# Patient Record
Sex: MALE | Race: WHITE | Employment: FULL TIME | URBAN - METROPOLITAN AREA
[De-identification: names, ages, dates, MRNs, and addresses within clinical notes are randomized per-mention and may not be internally consistent; named-entity substitution may affect disease eponyms.]

---

## 2019-07-03 ENCOUNTER — HOSPITAL ENCOUNTER (EMERGENCY)
Age: 44
Discharge: HOME | End: 2019-07-03
Attending: EMERGENCY MEDICINE
Payer: COMMERCIAL

## 2019-07-03 ENCOUNTER — APPOINTMENT (OUTPATIENT)
Dept: GENERAL RADIOLOGY | Age: 44
End: 2019-07-03
Payer: COMMERCIAL

## 2019-07-03 VITALS
HEIGHT: 70 IN | WEIGHT: 315 LBS | HEART RATE: 79 BPM | TEMPERATURE: 99 F | SYSTOLIC BLOOD PRESSURE: 123 MMHG | OXYGEN SATURATION: 95 % | RESPIRATION RATE: 12 BRPM | BODY MASS INDEX: 45.1 KG/M2 | DIASTOLIC BLOOD PRESSURE: 71 MMHG

## 2019-07-03 DIAGNOSIS — R06.02 SHORTNESS OF BREATH: ICD-10-CM

## 2019-07-03 DIAGNOSIS — J40 BRONCHITIS: Primary | ICD-10-CM

## 2019-07-03 LAB
A/G RATIO: 1.2 (ref 1.1–2.2)
ALBUMIN SERPL-MCNC: 4 G/DL (ref 3.4–5)
ALP BLD-CCNC: 96 U/L (ref 40–129)
ALT SERPL-CCNC: 39 U/L (ref 10–40)
ANION GAP SERPL CALCULATED.3IONS-SCNC: 12 MMOL/L (ref 3–16)
AST SERPL-CCNC: 24 U/L (ref 15–37)
BASOPHILS ABSOLUTE: 0.1 K/UL (ref 0–0.2)
BASOPHILS RELATIVE PERCENT: 0.8 %
BILIRUB SERPL-MCNC: 0.3 MG/DL (ref 0–1)
BUN BLDV-MCNC: 18 MG/DL (ref 7–20)
CALCIUM SERPL-MCNC: 9.4 MG/DL (ref 8.3–10.6)
CHLORIDE BLD-SCNC: 102 MMOL/L (ref 99–110)
CO2: 25 MMOL/L (ref 21–32)
CREAT SERPL-MCNC: 0.9 MG/DL (ref 0.9–1.3)
EKG ATRIAL RATE: 72 BPM
EKG DIAGNOSIS: NORMAL
EKG P AXIS: 65 DEGREES
EKG P-R INTERVAL: 188 MS
EKG Q-T INTERVAL: 402 MS
EKG QRS DURATION: 114 MS
EKG QTC CALCULATION (BAZETT): 440 MS
EKG R AXIS: -18 DEGREES
EKG T AXIS: 38 DEGREES
EKG VENTRICULAR RATE: 72 BPM
EOSINOPHILS ABSOLUTE: 0.2 K/UL (ref 0–0.6)
EOSINOPHILS RELATIVE PERCENT: 2.3 %
GFR AFRICAN AMERICAN: >60
GFR NON-AFRICAN AMERICAN: >60
GLOBULIN: 3.3 G/DL
GLUCOSE BLD-MCNC: 107 MG/DL (ref 70–99)
HCT VFR BLD CALC: 43.1 % (ref 40.5–52.5)
HEMOGLOBIN: 14.6 G/DL (ref 13.5–17.5)
LYMPHOCYTES ABSOLUTE: 2.2 K/UL (ref 1–5.1)
LYMPHOCYTES RELATIVE PERCENT: 26.4 %
MCH RBC QN AUTO: 30 PG (ref 26–34)
MCHC RBC AUTO-ENTMCNC: 34 G/DL (ref 31–36)
MCV RBC AUTO: 88.3 FL (ref 80–100)
MONOCYTES ABSOLUTE: 0.4 K/UL (ref 0–1.3)
MONOCYTES RELATIVE PERCENT: 5.2 %
NEUTROPHILS ABSOLUTE: 5.5 K/UL (ref 1.7–7.7)
NEUTROPHILS RELATIVE PERCENT: 65.3 %
PDW BLD-RTO: 13 % (ref 12.4–15.4)
PLATELET # BLD: 275 K/UL (ref 135–450)
PMV BLD AUTO: 6 FL (ref 5–10.5)
POTASSIUM REFLEX MAGNESIUM: 4.1 MMOL/L (ref 3.5–5.1)
RBC # BLD: 4.88 M/UL (ref 4.2–5.9)
SODIUM BLD-SCNC: 139 MMOL/L (ref 136–145)
TOTAL PROTEIN: 7.3 G/DL (ref 6.4–8.2)
TROPONIN: <0.01 NG/ML
WBC # BLD: 8.4 K/UL (ref 4–11)

## 2019-07-03 PROCEDURE — 80053 COMPREHEN METABOLIC PANEL: CPT

## 2019-07-03 PROCEDURE — 93010 ELECTROCARDIOGRAM REPORT: CPT | Performed by: INTERNAL MEDICINE

## 2019-07-03 PROCEDURE — 84484 ASSAY OF TROPONIN QUANT: CPT

## 2019-07-03 PROCEDURE — 99285 EMERGENCY DEPT VISIT HI MDM: CPT

## 2019-07-03 PROCEDURE — 71046 X-RAY EXAM CHEST 2 VIEWS: CPT

## 2019-07-03 PROCEDURE — 93005 ELECTROCARDIOGRAM TRACING: CPT | Performed by: EMERGENCY MEDICINE

## 2019-07-03 PROCEDURE — 6370000000 HC RX 637 (ALT 250 FOR IP): Performed by: PHYSICIAN ASSISTANT

## 2019-07-03 PROCEDURE — 85025 COMPLETE CBC W/AUTO DIFF WBC: CPT

## 2019-07-03 RX ORDER — MULTIVITAMIN
1 TABLET ORAL DAILY
COMMUNITY

## 2019-07-03 RX ORDER — PREDNISONE 10 MG/1
TABLET ORAL
Status: DISCONTINUED
Start: 2019-07-03 | End: 2019-07-03 | Stop reason: HOSPADM

## 2019-07-03 RX ORDER — DOXYCYCLINE HYCLATE 100 MG
100 TABLET ORAL 2 TIMES DAILY
Status: ON HOLD | COMMUNITY
Start: 2019-07-01 | End: 2022-05-11 | Stop reason: HOSPADM

## 2019-07-03 RX ORDER — PREDNISONE 10 MG/1
60 TABLET ORAL DAILY
Qty: 30 TABLET | Refills: 0 | Status: SHIPPED | OUTPATIENT
Start: 2019-07-03 | End: 2019-07-08

## 2019-07-03 RX ORDER — IPRATROPIUM BROMIDE AND ALBUTEROL SULFATE 2.5; .5 MG/3ML; MG/3ML
1 SOLUTION RESPIRATORY (INHALATION) ONCE
Status: COMPLETED | OUTPATIENT
Start: 2019-07-03 | End: 2019-07-03

## 2019-07-03 RX ORDER — LISINOPRIL 10 MG/1
10 TABLET ORAL DAILY
Status: ON HOLD | COMMUNITY
Start: 2019-06-18 | End: 2022-05-11 | Stop reason: HOSPADM

## 2019-07-03 RX ORDER — PREDNISONE 10 MG/1
60 TABLET ORAL ONCE
Status: COMPLETED | OUTPATIENT
Start: 2019-07-03 | End: 2019-07-03

## 2019-07-03 RX ORDER — ALBUTEROL SULFATE 90 UG/1
AEROSOL, METERED RESPIRATORY (INHALATION)
Qty: 1 INHALER | Refills: 1 | Status: SHIPPED | OUTPATIENT
Start: 2019-07-03

## 2019-07-03 RX ORDER — ASPIRIN 81 MG/1
81 TABLET ORAL DAILY
Status: ON HOLD | COMMUNITY
End: 2022-05-11 | Stop reason: HOSPADM

## 2019-07-03 RX ADMIN — PREDNISONE 60 MG: 10 TABLET ORAL at 19:38

## 2019-07-03 RX ADMIN — IPRATROPIUM BROMIDE AND ALBUTEROL SULFATE 1 AMPULE: .5; 3 SOLUTION RESPIRATORY (INHALATION) at 19:11

## 2019-07-03 ASSESSMENT — ENCOUNTER SYMPTOMS
COUGH: 1
GASTROINTESTINAL NEGATIVE: 1
SHORTNESS OF BREATH: 1

## 2019-07-03 NOTE — ED PROVIDER NOTES
[address] ED  EMERGENCY DEPARTMENT ENCOUNTER        Pt Name: Marcella Farmer  MRN: [id number]  [place] 1975  Date of evaluation: 7/3/2019  Provider: Maryana Tenorio PA-C  PCP: No primary care provider on file. This patient was seen and evaluated by the attending physician Dr. Myesha Meneses. CHIEF COMPLAINT       Chief Complaint   Patient presents with    Shortness of Breath     pt reports taking zpack last week for cough/sob/wheezing, pt states not any better, pcp placed pt on doxy 2 days ago, pt states cough is better but still sob and wheezes when ambulating       HISTORY OF PRESENT ILLNESS   (Location/Symptom, Timing/Onset, Context/Setting, Quality, Duration, Modifying Factors, Severity)  Note limiting factors. Marcella Farmer is a 53 y.o. male brought in by private vehicle for complaints of a nonproductive cough for the past 1 week. Patient states he saw his primary care about a week ago and was given azithromycin. Patient states the cough continued he followed up with his PCP who gave him doxycycline 2 days ago. Onset of symptoms have been over the course of 1 week. Duration of symptoms have been persistent since onset. No aggravating symptoms. No alleviating symptoms. Patient denies any other complaints. Denies any chest pain. Patient denies any smoking history. Nursing Notes were all reviewed and agreed with or any disagreements were addressed  in the HPI. REVIEW OF SYSTEMS    (2-9 systems for level 4, 10 or more for level 5)     Review of Systems   Constitutional: Negative. HENT: Negative. Respiratory: Positive for cough and shortness of breath. Cardiovascular: Negative. Gastrointestinal: Negative. Genitourinary: Negative. Musculoskeletal: Negative. Skin: Negative. Neurological: Negative. Positives and Pertinent negatives as per HPI. Except as noted abovein the ROS, all other systems were reviewed and negative.        PAST MEDICAL HISTORY     Past Medical History:   Diagnosis Date    Hypertension          SURGICAL HISTORY     Past Surgical History:   Procedure Laterality Date    ANKLE SURGERY Left     CARPAL TUNNEL RELEASE Right     KNEE ARTHROSCOPY Right     TONSILLECTOMY AND ADENOIDECTOMY      TYMPANOSTOMY TUBE PLACEMENT           [address]       Discharge Medication List as of 7/3/2019  7:56 PM      CONTINUE these medications which have NOT CHANGED    Details   Cetirizine HCl (ZYRTEC ALLERGY PO) Take 1 tablet by mouth dailyHistorical Med      doxycycline hyclate (VIBRA-TABS) 100 MG tablet Take 100 mg by mouth 2 times dailyHistorical Med      Multiple Vitamin (MULTIVITAMIN) tablet Take 1 tablet by mouth dailyHistorical Med      lisinopril (PRINIVIL;ZESTRIL) 10 MG tablet Take 10 mg by mouth dailyHistorical Med      aspirin 81 MG EC tablet Take 81 mg by mouth dailyHistorical Med      Dextromethorphan-guaiFENesin (MUCINEX DM PO) Take by mouth 2 times dailyHistorical Med               ALLERGIES     Ampicillin; Bee venom; and Testosterone    FAMILYHISTORY     History reviewed. No pertinent family history.        SOCIAL HISTORY       Social History     Socioeconomic History    Marital status:      Spouse name: None    Number of children: None    Years of education: None    Highest education level: None   Occupational History    None   Social Needs    Financial resource strain: None    Food insecurity:     Worry: None     Inability: None    Transportation needs:     Medical: None     Non-medical: None   Tobacco Use    Smoking status: Never Smoker    Smokeless tobacco: Never Used   Substance and Sexual Activity    Alcohol use: Not Currently    Drug use: Not Currently    Sexual activity: None   Lifestyle    Physical activity:     Days per week: None     Minutes per session: None    Stress: None   Relationships    Social connections:     Talks on phone: None     Gets together: None     Attends J[employer] service: silhouette, mediastinal and hilar contours are normal.  The lungs are clear. There are no pleural effusions. No acute osseous abnormalities are identified. No acute cardiopulmonary disease. PROCEDURES   Unless otherwise noted below, none     Procedures    CRITICAL CARE TIME   N/A    CONSULTS:  None      EMERGENCY DEPARTMENT COURSE and DIFFERENTIAL DIAGNOSIS/MDM:   Vitals:    Vitals:    07/03/19 1745 07/03/19 1808 07/03/19 1834 07/03/19 1934   BP: (!) 152/93 129/71 123/75 123/71   Pulse: 70 72 77 79   Resp: 18 14 15 12   Temp: 99 °F (37.2 °C)      TempSrc: Oral      SpO2: 95% 98% 100% 95%   Weight: (!) 344 lb (156 kg)      Height: 5' 10\" (1.778 m)          Patient was given thefollowing medications:  Medications   predniSONE (DELTASONE) 10 MG tablet (has no administration in time range)   ipratropium-albuterol (DUONEB) nebulizer solution 1 ampule (1 ampule Inhalation Given 7/3/19 1911)   predniSONE (DELTASONE) tablet 60 mg (60 mg Oral Given 7/3/19 1938)       Patient was brought in for evaluation of a nonproductive cough for the past week. On exam patient is alert oriented afebrile hemodynamically stable breathing on room air satting at 95%. Patient appears nontoxic and in no acute respiratory distress. Old labs and records reviewed. EKG was obtained please see my attendings note for details. Chest x-ray was unremarkable. Blood work unremarkable without any acute leukocytosis or electrotlye abnormalities. Troponin is negative. Patient given steroids and a breathing treatment here. Will be discharged with breathing treatments and steroids for home. Patient given follow-up for a new primary care provider. Patient seen and evaluated by my attending as well. Patient advised to return with any new or worsening symptoms such as chest pain increased shortness of breath dizziness or feeling lightheaded. Patient verbalized understanding of when to return and was comfortable at time of discharge.   We

## 2019-07-04 NOTE — ED PROVIDER NOTES
I independently performed a history and physical on [address] [place] [address]. All diagnostic, treatment, and disposition decisions were made by myself in conjunction with the mid-level provider. Patient has had 10 days of nonproductive cough worse at night, has trace expiratory wheeze after coughing has bronchospasm with forced cough as well. He has not improved with a Z-Ilya and doxycycline, x-rays were done to rule out pneumonia. No pain or swelling in his legs, no concerns for pulmonary embolism, will treat with steroids and an inhaler for likely bronchitis. For further details of [facility] emergency department encounter, please see Jolene's documentation. The Ekg interpreted by me shows  normal sinus rhythm with a rate of 72  Axis is   Normal  QTc is  normal  Intervals and Durations are unremarkable. ST Segments: normal  No prior ekg's  Xr Chest Standard (2 Vw)    Result Date: 7/3/2019  EXAMINATION: TWO XRAY VIEWS OF THE CHEST 7/3/2019 6:01 pm COMPARISON: None. HISTORY: ORDERING SYSTEM PROVIDED HISTORY: shortness of breath TECHNOLOGIST PROVIDED HISTORY: Reason for exam:->shortness of breath Reason for Exam: Shortness of Breath (pt reports taking zpack last week for cough/sob/wheezing, pt states not any better, pcp placed pt on doxy 2 days ago, pt states cough is better but still sob and wheezes when ambulating) Acuity: Acute Type of Exam: Initial FINDINGS: The cardiac silhouette, mediastinal and hilar contours are normal.  The lungs are clear. There are no pleural effusions. No acute osseous abnormalities are identified. No acute cardiopulmonary disease.      Results for orders placed or performed during the hospital encounter of 07/03/19   CBC auto differential   Result Value Ref Range    WBC 8.4 4.0 - 11.0 K/uL    RBC 4.88 4.20 - 5.90 M/uL    Hemoglobin 14.6 13.5 - 17.5 g/dL    Hematocrit 43.1 40.5 - 52.5 %    MCV 88.3 80.0 - 100.0 fL    MCH 30.0 26.0 - 34.0 pg    MCHC 34.0 31.0 - 36.0 g/dL

## 2022-05-06 ENCOUNTER — APPOINTMENT (OUTPATIENT)
Dept: NUCLEAR MEDICINE | Age: 47
DRG: 176 | End: 2022-05-06
Payer: COMMERCIAL

## 2022-05-06 ENCOUNTER — APPOINTMENT (OUTPATIENT)
Dept: GENERAL RADIOLOGY | Age: 47
DRG: 176 | End: 2022-05-06
Payer: COMMERCIAL

## 2022-05-06 ENCOUNTER — HOSPITAL ENCOUNTER (INPATIENT)
Age: 47
LOS: 1 days | Discharge: HOME | DRG: 176 | End: 2022-05-11
Attending: STUDENT IN AN ORGANIZED HEALTH CARE EDUCATION/TRAINING PROGRAM | Admitting: HOSPITALIST
Payer: COMMERCIAL

## 2022-05-06 DIAGNOSIS — R06.02 SHORTNESS OF BREATH: Primary | ICD-10-CM

## 2022-05-06 DIAGNOSIS — R07.9 CHEST PAIN, UNSPECIFIED TYPE: ICD-10-CM

## 2022-05-06 PROBLEM — I10 HTN (HYPERTENSION): Status: ACTIVE | Noted: 2022-05-06

## 2022-05-06 PROBLEM — K21.9 GERD (GASTROESOPHAGEAL REFLUX DISEASE): Status: ACTIVE | Noted: 2022-05-06

## 2022-05-06 PROBLEM — J45.909 REACTIVE AIRWAY DISEASE WITHOUT COMPLICATION: Status: ACTIVE | Noted: 2022-05-06

## 2022-05-06 PROBLEM — M54.9 BACK PAIN: Status: ACTIVE | Noted: 2022-05-06

## 2022-05-06 LAB
A/G RATIO: 1.9 (ref 1.1–2.2)
ALBUMIN SERPL-MCNC: 4 G/DL (ref 3.4–5)
ALP BLD-CCNC: 90 U/L (ref 40–129)
ALT SERPL-CCNC: 31 U/L (ref 10–40)
ANION GAP SERPL CALCULATED.3IONS-SCNC: 11 MMOL/L (ref 3–16)
AST SERPL-CCNC: 15 U/L (ref 15–37)
BASOPHILS ABSOLUTE: 0 K/UL (ref 0–0.2)
BASOPHILS RELATIVE PERCENT: 0.4 %
BILIRUB SERPL-MCNC: 0.4 MG/DL (ref 0–1)
BUN BLDV-MCNC: 19 MG/DL (ref 7–20)
CALCIUM SERPL-MCNC: 8.7 MG/DL (ref 8.3–10.6)
CHLORIDE BLD-SCNC: 102 MMOL/L (ref 99–110)
CO2: 25 MMOL/L (ref 21–32)
CREAT SERPL-MCNC: 0.7 MG/DL (ref 0.9–1.3)
EKG ATRIAL RATE: 73 BPM
EKG ATRIAL RATE: 91 BPM
EKG DIAGNOSIS: NORMAL
EKG DIAGNOSIS: NORMAL
EKG P AXIS: 59 DEGREES
EKG P AXIS: 61 DEGREES
EKG P-R INTERVAL: 174 MS
EKG P-R INTERVAL: 192 MS
EKG Q-T INTERVAL: 372 MS
EKG Q-T INTERVAL: 408 MS
EKG QRS DURATION: 114 MS
EKG QRS DURATION: 116 MS
EKG QTC CALCULATION (BAZETT): 449 MS
EKG QTC CALCULATION (BAZETT): 457 MS
EKG R AXIS: -18 DEGREES
EKG R AXIS: -34 DEGREES
EKG T AXIS: 30 DEGREES
EKG T AXIS: 43 DEGREES
EKG VENTRICULAR RATE: 73 BPM
EKG VENTRICULAR RATE: 91 BPM
EOSINOPHILS ABSOLUTE: 0.3 K/UL (ref 0–0.6)
EOSINOPHILS RELATIVE PERCENT: 3 %
GFR AFRICAN AMERICAN: >60
GFR NON-AFRICAN AMERICAN: >60
GLUCOSE BLD-MCNC: 106 MG/DL (ref 70–99)
HCT VFR BLD CALC: 44 % (ref 40.5–52.5)
HEMOGLOBIN: 15.3 G/DL (ref 13.5–17.5)
INFLUENZA A: NOT DETECTED
INFLUENZA B: NOT DETECTED
LV EF: 53 %
LVEF MODALITY: NORMAL
LYMPHOCYTES ABSOLUTE: 2.7 K/UL (ref 1–5.1)
LYMPHOCYTES RELATIVE PERCENT: 25.5 %
MCH RBC QN AUTO: 30.1 PG (ref 26–34)
MCHC RBC AUTO-ENTMCNC: 34.7 G/DL (ref 31–36)
MCV RBC AUTO: 86.7 FL (ref 80–100)
MONOCYTES ABSOLUTE: 0.4 K/UL (ref 0–1.3)
MONOCYTES RELATIVE PERCENT: 3.9 %
NEUTROPHILS ABSOLUTE: 7 K/UL (ref 1.7–7.7)
NEUTROPHILS RELATIVE PERCENT: 67.2 %
PDW BLD-RTO: 13.4 % (ref 12.4–15.4)
PLATELET # BLD: 210 K/UL (ref 135–450)
PMV BLD AUTO: 6.4 FL (ref 5–10.5)
POTASSIUM SERPL-SCNC: 3.7 MMOL/L (ref 3.5–5.1)
RBC # BLD: 5.07 M/UL (ref 4.2–5.9)
SARS-COV-2 RNA, RT PCR: NOT DETECTED
SODIUM BLD-SCNC: 138 MMOL/L (ref 136–145)
TOTAL PROTEIN: 6.1 G/DL (ref 6.4–8.2)
TROPONIN: <0.01 NG/ML
TROPONIN: <0.01 NG/ML
WBC # BLD: 10.4 K/UL (ref 4–11)

## 2022-05-06 PROCEDURE — 94761 N-INVAS EAR/PLS OXIMETRY MLT: CPT

## 2022-05-06 PROCEDURE — 6360000002 HC RX W HCPCS: Performed by: HOSPITALIST

## 2022-05-06 PROCEDURE — G0378 HOSPITAL OBSERVATION PER HR: HCPCS

## 2022-05-06 PROCEDURE — 71045 X-RAY EXAM CHEST 1 VIEW: CPT

## 2022-05-06 PROCEDURE — 93010 ELECTROCARDIOGRAM REPORT: CPT | Performed by: INTERNAL MEDICINE

## 2022-05-06 PROCEDURE — 93005 ELECTROCARDIOGRAM TRACING: CPT | Performed by: STUDENT IN AN ORGANIZED HEALTH CARE EDUCATION/TRAINING PROGRAM

## 2022-05-06 PROCEDURE — 99285 EMERGENCY DEPT VISIT HI MDM: CPT

## 2022-05-06 PROCEDURE — 6370000000 HC RX 637 (ALT 250 FOR IP)

## 2022-05-06 PROCEDURE — 6370000000 HC RX 637 (ALT 250 FOR IP): Performed by: STUDENT IN AN ORGANIZED HEALTH CARE EDUCATION/TRAINING PROGRAM

## 2022-05-06 PROCEDURE — 85025 COMPLETE CBC W/AUTO DIFF WBC: CPT

## 2022-05-06 PROCEDURE — 36415 COLL VENOUS BLD VENIPUNCTURE: CPT

## 2022-05-06 PROCEDURE — 96372 THER/PROPH/DIAG INJ SC/IM: CPT

## 2022-05-06 PROCEDURE — 3430000000 HC RX DIAGNOSTIC RADIOPHARMACEUTICAL: Performed by: HOSPITALIST

## 2022-05-06 PROCEDURE — 84484 ASSAY OF TROPONIN QUANT: CPT

## 2022-05-06 PROCEDURE — 87636 SARSCOV2 & INF A&B AMP PRB: CPT

## 2022-05-06 PROCEDURE — 99218 PR INITIAL OBSERVATION CARE/DAY 30 MINUTES: CPT

## 2022-05-06 PROCEDURE — 80053 COMPREHEN METABOLIC PANEL: CPT

## 2022-05-06 PROCEDURE — A9502 TC99M TETROFOSMIN: HCPCS | Performed by: HOSPITALIST

## 2022-05-06 PROCEDURE — 93005 ELECTROCARDIOGRAM TRACING: CPT | Performed by: HOSPITALIST

## 2022-05-06 PROCEDURE — 93017 CV STRESS TEST TRACING ONLY: CPT

## 2022-05-06 PROCEDURE — 94640 AIRWAY INHALATION TREATMENT: CPT

## 2022-05-06 PROCEDURE — 78452 HT MUSCLE IMAGE SPECT MULT: CPT

## 2022-05-06 PROCEDURE — 2580000003 HC RX 258: Performed by: HOSPITALIST

## 2022-05-06 PROCEDURE — 6370000000 HC RX 637 (ALT 250 FOR IP): Performed by: HOSPITALIST

## 2022-05-06 RX ORDER — FLUTICASONE PROPIONATE 50 MCG
2 SPRAY, SUSPENSION (ML) NASAL DAILY
Status: DISCONTINUED | OUTPATIENT
Start: 2022-05-06 | End: 2022-05-11 | Stop reason: HOSPADM

## 2022-05-06 RX ORDER — ASPIRIN 81 MG/1
81 TABLET ORAL DAILY
Status: DISCONTINUED | OUTPATIENT
Start: 2022-05-06 | End: 2022-05-09

## 2022-05-06 RX ORDER — ONDANSETRON 2 MG/ML
4 INJECTION INTRAMUSCULAR; INTRAVENOUS EVERY 6 HOURS PRN
Status: DISCONTINUED | OUTPATIENT
Start: 2022-05-06 | End: 2022-05-11 | Stop reason: HOSPADM

## 2022-05-06 RX ORDER — FLUTICASONE FUROATE AND VILANTEROL 100; 25 UG/1; UG/1
1 POWDER RESPIRATORY (INHALATION) DAILY
COMMUNITY

## 2022-05-06 RX ORDER — CETIRIZINE HYDROCHLORIDE 10 MG/1
10 TABLET ORAL DAILY
Status: DISCONTINUED | OUTPATIENT
Start: 2022-05-06 | End: 2022-05-11 | Stop reason: HOSPADM

## 2022-05-06 RX ORDER — ACETAMINOPHEN 325 MG/1
650 TABLET ORAL EVERY 6 HOURS PRN
Status: DISCONTINUED | OUTPATIENT
Start: 2022-05-06 | End: 2022-05-11 | Stop reason: HOSPADM

## 2022-05-06 RX ORDER — BUDESONIDE AND FORMOTEROL FUMARATE DIHYDRATE 80; 4.5 UG/1; UG/1
2 AEROSOL RESPIRATORY (INHALATION) 2 TIMES DAILY
Status: DISCONTINUED | OUTPATIENT
Start: 2022-05-06 | End: 2022-05-11 | Stop reason: HOSPADM

## 2022-05-06 RX ORDER — LISINOPRIL 10 MG/1
10 TABLET ORAL DAILY
Status: DISCONTINUED | OUTPATIENT
Start: 2022-05-06 | End: 2022-05-06

## 2022-05-06 RX ORDER — GABAPENTIN 100 MG/1
100 CAPSULE ORAL 3 TIMES DAILY
COMMUNITY

## 2022-05-06 RX ORDER — FLUTICASONE PROPIONATE 50 MCG
2 SPRAY, SUSPENSION (ML) NASAL DAILY
COMMUNITY

## 2022-05-06 RX ORDER — SODIUM CHLORIDE 0.9 % (FLUSH) 0.9 %
5-40 SYRINGE (ML) INJECTION EVERY 12 HOURS SCHEDULED
Status: DISCONTINUED | OUTPATIENT
Start: 2022-05-06 | End: 2022-05-11 | Stop reason: HOSPADM

## 2022-05-06 RX ORDER — PANTOPRAZOLE SODIUM 40 MG/1
40 TABLET, DELAYED RELEASE ORAL
Status: DISCONTINUED | OUTPATIENT
Start: 2022-05-06 | End: 2022-05-11 | Stop reason: HOSPADM

## 2022-05-06 RX ORDER — SODIUM CHLORIDE 0.9 % (FLUSH) 0.9 %
5-40 SYRINGE (ML) INJECTION PRN
Status: DISCONTINUED | OUTPATIENT
Start: 2022-05-06 | End: 2022-05-11 | Stop reason: HOSPADM

## 2022-05-06 RX ORDER — ONDANSETRON 4 MG/1
4 TABLET, ORALLY DISINTEGRATING ORAL EVERY 8 HOURS PRN
Status: DISCONTINUED | OUTPATIENT
Start: 2022-05-06 | End: 2022-05-11 | Stop reason: HOSPADM

## 2022-05-06 RX ORDER — SODIUM CHLORIDE 9 MG/ML
INJECTION, SOLUTION INTRAVENOUS PRN
Status: DISCONTINUED | OUTPATIENT
Start: 2022-05-06 | End: 2022-05-11 | Stop reason: HOSPADM

## 2022-05-06 RX ORDER — VALSARTAN 80 MG/1
80 TABLET ORAL DAILY
Status: DISCONTINUED | OUTPATIENT
Start: 2022-05-06 | End: 2022-05-11 | Stop reason: HOSPADM

## 2022-05-06 RX ORDER — POLYETHYLENE GLYCOL 3350 17 G/17G
17 POWDER, FOR SOLUTION ORAL DAILY PRN
Status: DISCONTINUED | OUTPATIENT
Start: 2022-05-06 | End: 2022-05-11 | Stop reason: HOSPADM

## 2022-05-06 RX ORDER — ASPIRIN 325 MG
325 TABLET ORAL ONCE
Status: COMPLETED | OUTPATIENT
Start: 2022-05-06 | End: 2022-05-06

## 2022-05-06 RX ORDER — ACETAMINOPHEN 650 MG/1
650 SUPPOSITORY RECTAL EVERY 6 HOURS PRN
Status: DISCONTINUED | OUTPATIENT
Start: 2022-05-06 | End: 2022-05-11 | Stop reason: HOSPADM

## 2022-05-06 RX ORDER — OMEPRAZOLE 20 MG/1
40 CAPSULE, DELAYED RELEASE ORAL DAILY
COMMUNITY

## 2022-05-06 RX ORDER — ATORVASTATIN CALCIUM 40 MG/1
40 TABLET, FILM COATED ORAL NIGHTLY
Status: DISCONTINUED | OUTPATIENT
Start: 2022-05-06 | End: 2022-05-09

## 2022-05-06 RX ORDER — GABAPENTIN 100 MG/1
100 CAPSULE ORAL 3 TIMES DAILY
Status: DISCONTINUED | OUTPATIENT
Start: 2022-05-06 | End: 2022-05-11 | Stop reason: HOSPADM

## 2022-05-06 RX ORDER — VALSARTAN 80 MG/1
80 TABLET ORAL DAILY
COMMUNITY

## 2022-05-06 RX ORDER — IPRATROPIUM BROMIDE AND ALBUTEROL SULFATE 2.5; .5 MG/3ML; MG/3ML
1 SOLUTION RESPIRATORY (INHALATION) ONCE
Status: COMPLETED | OUTPATIENT
Start: 2022-05-06 | End: 2022-05-06

## 2022-05-06 RX ORDER — ENOXAPARIN SODIUM 100 MG/ML
40 INJECTION SUBCUTANEOUS 2 TIMES DAILY
Status: DISCONTINUED | OUTPATIENT
Start: 2022-05-06 | End: 2022-05-09

## 2022-05-06 RX ADMIN — ENOXAPARIN SODIUM 40 MG: 100 INJECTION SUBCUTANEOUS at 21:19

## 2022-05-06 RX ADMIN — ASPIRIN 325 MG: 325 TABLET ORAL at 04:11

## 2022-05-06 RX ADMIN — ENOXAPARIN SODIUM 40 MG: 100 INJECTION SUBCUTANEOUS at 08:55

## 2022-05-06 RX ADMIN — REGADENOSON 0.4 MG: 0.08 INJECTION, SOLUTION INTRAVENOUS at 11:59

## 2022-05-06 RX ADMIN — VALSARTAN 80 MG: 80 TABLET, FILM COATED ORAL at 09:25

## 2022-05-06 RX ADMIN — IPRATROPIUM BROMIDE AND ALBUTEROL SULFATE 1 AMPULE: .5; 3 SOLUTION RESPIRATORY (INHALATION) at 04:20

## 2022-05-06 RX ADMIN — Medication 10 ML: at 21:20

## 2022-05-06 RX ADMIN — GABAPENTIN 100 MG: 100 CAPSULE ORAL at 09:25

## 2022-05-06 RX ADMIN — Medication 2 PUFF: at 19:01

## 2022-05-06 RX ADMIN — ATORVASTATIN CALCIUM 40 MG: 40 TABLET, FILM COATED ORAL at 21:20

## 2022-05-06 RX ADMIN — CETIRIZINE HYDROCHLORIDE 10 MG: 10 TABLET ORAL at 08:56

## 2022-05-06 RX ADMIN — PANTOPRAZOLE SODIUM 40 MG: 40 TABLET, DELAYED RELEASE ORAL at 09:25

## 2022-05-06 RX ADMIN — FLUTICASONE PROPIONATE 2 SPRAY: 50 SPRAY, METERED NASAL at 16:07

## 2022-05-06 RX ADMIN — TETROFOSMIN 30.1 MILLICURIE: 1.38 INJECTION, POWDER, LYOPHILIZED, FOR SOLUTION INTRAVENOUS at 12:00

## 2022-05-06 ASSESSMENT — PAIN DESCRIPTION - LOCATION: LOCATION: CHEST

## 2022-05-06 ASSESSMENT — PAIN DESCRIPTION - DESCRIPTORS: DESCRIPTORS: DULL;DISCOMFORT

## 2022-05-06 ASSESSMENT — LIFESTYLE VARIABLES
HOW OFTEN DO YOU HAVE A DRINK CONTAINING ALCOHOL: NEVER
HOW OFTEN DO YOU HAVE A DRINK CONTAINING ALCOHOL: NEVER

## 2022-05-06 ASSESSMENT — PAIN DESCRIPTION - ORIENTATION: ORIENTATION: MID

## 2022-05-06 ASSESSMENT — PAIN - FUNCTIONAL ASSESSMENT: PAIN_FUNCTIONAL_ASSESSMENT: 0-10

## 2022-05-06 NOTE — PROGRESS NOTES
4 Eyes Skin Assessment     The patient is being assess for   Admission    I agree that 2 RN's have performed a thorough Head to Toe Skin Assessment on the patient. ALL assessment sites listed below have been assessed. Areas assessed for pressure by both nurses:   [x]   Head, Face, and Ears   [x]   Shoulders, Back, and Chest, Abdomen  [x]   Arms, Elbows, and Hands   [x]   Coccyx, Sacrum, and Ischium  [x]   Legs, Feet, and Heels        Skin Assessed Under all Medical Devices by both nurses:  N/A              All Mepilex Borders were peeled back and area peeked at by both nurses:  No: N/A  Please list where Mepilex Borders are located:  N/A             **SHARE this note so that the co-signing nurse is able to place an eSignature**    Co-signer eSignature: Electronically signed by Thais Rodriguez RN on 5/6/22 at 6:27 PM EDT    Does the Patient have Skin Breakdown related to pressure?   No            Mu Prevention initiated:  NA   Wound Care Orders initiated:  NA      [facility] nurse consulted for Pressure Injury (Stage 3,4, Unstageable, DTI, NWPT, Complex wounds)and New or Established Ostomies:  NA      Primary Nurse eSignature: Electronically signed by Rakel Hurst RN on 5/6/22 at 6:21 PM EDT

## 2022-05-06 NOTE — H&P
Hospital Medicine History & Physical      PCP: Jenn Gupta MD    Date of Admission: 5/6/2022    Date of Service: Pt seen/examined on 5/6/2022      Chief Complaint:    Chief Complaint   Patient presents with    Shortness of Breath     Hx: asthma; being on prednisone 8 days; feeling jittery and increased SOB tonight; pt c/o soreness to chest 6/10         History Of Present Illness: The patient is a 50 y.o. male with HTN who presented to [facility] ED with complaint of chest pain that has been present since yesterday. He states that he just completed an 8 day course of prednisone for back pain. Starting yesterday he had a tingling sensation begin at his left upper chest which radiated down his left arm. He did not describe this as pain. He states that he eventually developed chest \"pressure\" over his breastbone. He states this does not radiate. It has been intermittent since onset. Associated with SOB that he describes as \"not being able to take a deep breath\". Aggravated by nothing. Alleviated by nothing. He jones not have history of pain like this in the past.  He endorses having a cardiac work up in the past x2, more for surveillance as his father does have history of heart disease and apparently his whole family went to get checked. Denies any URI symptoms, cough, pain with inspiration, recent new exercises or heavy lifting, abd pain, n/v/d. Cardiac risk factors:   HTN: Yes  HLD: No  DM: No  Body mass index is 51.65 kg/m². Tobacco abuse: Never  First degree family or personal history of CAD: Father with heart disease    The patient denies any known history of connective tissue disease or aneurysms. The patient denies any recent surgery, hospitalization, immobilization, long car/plane trip, active malignancy, long bone fracture, or history of DVT/PE.       Past Medical History:        Diagnosis Date    Arthritis     Asthma     Hypertension        Past Surgical History:        Procedure Laterality Date    ANKLE SURGERY Left     CARPAL TUNNEL RELEASE Right     KNEE ARTHROSCOPY Right     TONSILLECTOMY AND ADENOIDECTOMY      TYMPANOSTOMY TUBE PLACEMENT         Medications Prior to Admission:    Prior to Admission medications    Medication Sig Start Date End Date Taking? Authorizing Provider   fluticasone (FLONASE) 50 MCG/ACT nasal spray 2 sprays by Each Nostril route daily   Yes Historical Provider, MD   fluticasone-vilanterol (BREO ELLIPTA) 100-25 MCG/INH AEPB inhaler Inhale 1 puff into the lungs daily   Yes Historical Provider, MD   valsartan (DIOVAN) 80 MG tablet Take 80 mg by mouth daily   Yes Historical Provider, MD   omeprazole (PRILOSEC) 20 MG delayed release capsule Take 40 mg by mouth daily   Yes Historical Provider, MD   gabapentin (NEURONTIN) 100 MG capsule Take 100 mg by mouth 3 times daily. As needed   Yes Historical Provider, MD   Cetirizine HCl (ZYRTEC ALLERGY PO) Take 1 tablet by mouth daily    Historical Provider, MD   doxycycline hyclate (VIBRA-TABS) 100 MG tablet Take 100 mg by mouth 2 times daily  Patient not taking: Reported on 5/6/2022 7/1/19   Historical Provider, MD   Multiple Vitamin (MULTIVITAMIN) tablet Take 1 tablet by mouth daily    Historical Provider, MD   lisinopril (PRINIVIL;ZESTRIL) 10 MG tablet Take 10 mg by mouth daily  Patient not taking: Reported on 5/6/2022 6/18/19   Historical Provider, MD   aspirin 81 MG EC tablet Take 81 mg by mouth daily    Historical Provider, MD   Dextromethorphan-guaiFENesin ([address] DM PO) Take by mouth 2 times daily    Historical Provider, MD   albuterol sulfate HFA (PROAIR HFA) 108 (90 Base) MCG/ACT inhaler Use every 4 hours 2 puffs while awake for 7-10 days then PRN wheezing  Dispense with SPACER and Instruct on use. May sub Ventolin or Proventil as needed per [employer].   Patient not taking: Reported on 5/6/2022 7/3/19   Yunior Finney PA-C       Allergies:  Ampicillin, Bee venom, and Testosterone    Social History:  The patient currently lives at home with wife    TOBACCO:   reports that he has never smoked. He has never used smokeless tobacco.  ETOH:   reports previous alcohol use. Family History:   Positive as follows:    History reviewed. No pertinent family history. REVIEW OF SYSTEMS:       Constitutional: Negative for fever   HENT: Negative for sore throat   Eyes: Negative for redness   Respiratory: + dyspnea, negative for cough   Cardiovascular: + for chest pain  Gastrointestinal: Negative for vomiting, diarrhea   Genitourinary: Negative for hematuria   Musculoskeletal: Negative for arthralgias   Skin: Negative for rash   Neurological: Negative for syncope   Hematological: Negative for adenopathy   Psychiatric/Behavorial: Negative for anxiety    PHYSICAL EXAM:    /85   Pulse 80   Temp 98 °F (36.7 °C) (Oral)   Resp 8   Ht 5' 10\" (1.778 m)   Wt (!) 360 lb (163.3 kg)   SpO2 93%   BMI 51.65 kg/m²     Gen: No distress. Alert. Obese. Eyes: PERRL. No sclera icterus. No conjunctival injection. ENT: No discharge. Pharynx clear. Neck: No JVD. Trachea midline. Resp: No accessory muscle use. No crackles. No wheezes. No rhonchi. CV: Regular rate. Regular rhythm. No murmur. No rub. No edema. Capillary Refill: Brisk,< 3 seconds   Peripheral Pulses: +2 palpable, equal bilaterally   GI: Non-tender. Non-distended. No masses. No organomegaly. Normal bowel sounds. No hernia. Skin: Warm and dry. No nodule on exposed extremities. No rash on exposed extremities. M/S: No cyanosis. No joint deformity. No clubbing. Neuro: Awake. Grossly nonfocal    Psych: Oriented x 3. No anxiety or agitation.      CBC:   Recent Labs     05/06/22  0220   WBC 10.4   HGB 15.3   HCT 44.0   MCV 86.7        BMP:   Recent Labs     05/06/22 0315      K 3.7      CO2 25   BUN 19   CREATININE 0.7*     LIVER PROFILE:   Recent Labs     05/06/22 0315   AST 15   ALT 31   BILITOT 0.4   ALKPHOS 90     CARDIAC ENZYMES  Recent Labs 05/06/22  0315 05/06/22  0558   TROPONINI <0.01 <0.01     CULTURES  Results for Sarah Saldaña (MRN [id number]) as of 5/6/2022 08:13   Ref. Range 5/6/2022 02:45   INFLUENZA A Latest Ref Range: NOT DETECTED  NOT DETECTED   INFLUENZA B Latest Ref Range: NOT DETECTED  NOT DETECTED   SARS-CoV-2 RNA, RT PCR Latest Ref Range: NOT DETECTED  NOT DETECTED       EKG:  I have reviewed the EKG with the following interpretation:   5/6/22  Normal sinus rhythm  Normal ECG  When compared with ECG of 06-MAY-2022 02:16, (unconfirmed)  No significant change was found    5/6/22  Normal sinus rhythm  Left axis deviation  Abnormal ECG  When compared with ECG of 03-JUL-2019 17:48,  No significant change was found    RADIOLOGY  XR CHEST PORTABLE   Final Result   Clear chest without acute cardiopulmonary process. NM Cardiac Stress Test Nuclear Imaging    (Results Pending)        Pertinent previous results reviewed   Stress ECHO 3//23/18  Interpetation Summary:      1. Negative ECG for ischemia with graded exercise test.    2. Duke Treadmill Score is 6 which indicates low risk.    3. Negative stress echo with no indication of inducible ischemia.    4. Prolonged run of ventricular bigeminy with exercise. Stress ECHO 9/10/12  IMPRESSION- Nondiagnostic and unremarkable rest/stress   echocardiogram using echocontrast. The study is nondiagnostic due to   inability to reach targeted heart rate. ASSESSMENT/PLAN:  #Chest pain  -Cardiac risk factors:   HTN: Yes  HLD: No  DM: No  Body mass index is 51.65 kg/m².    Tobacco abuse: Never  First degree family or personal history of CAD: Father with heart disease  -EKG x2 negative for ischemia  -Serial trops negative thus far  -2 previous negative cardiac stress tests  -Pharm stress test today; possibility of 2 day  -Patient denies needs for anxiolytic for NM imaging    #HTN  -BP stable  -Continue valsartan    #GERD  -On PPI    #RAD  -New diagnosis per patient  -Continue home daily inhaler    #Back pain  -Currently being worked up for this OP  -Recently prescribed 4/27/22 Gabapentin 100 mg TID for 30 days; continue    DVT Prophylaxis: Lovenox  Diet: Diet NPO  Diet NPO  Code Status: Full Code    Constantine Espinoza PA-C  5/6/2022 8:49 AM

## 2022-05-06 NOTE — ACP (ADVANCE CARE PLANNING)
Advance Care Planning     Advance Care Planning Inpatient Note  [facility] Department    Today's Date: 5/6/2022  Unit: [address] ED    Received request from patient. Upon review of chart and communication with care team, patient's decision making abilities are not in question. . Patient was/were present in the room during visit. Goals of ACP Conversation:  Discuss advance care planning documents    Health Care Decision Makers:     No healthcare decision makers have been documented. Click here to complete [address] including selection of the Healthcare Decision Maker Relationship (ie \"Primary\")  Summary:  Completed [facility]    Advance Care Planning Documents (Patient Wishes):  Healthcare Power of /Advance Directive Appointment of Health Care Agent     Assessment:  Pt clear that he wanted his wife to make decisions on his behalf, including end of life decisions. Pt thus declined completing Living Will at this time. Interventions:  Assisted in the completion of documents according to patient's wishes at this time    Care Preferences Communicated:   No    Outcomes/Plan:  New advance directive completed. Returned original document(s) to patient, as well as copies for distribution to appointed agents  Copy of advance directive given to staff to scan into medical record.     Electronically signed by Jaqueline Mitchell on 5/6/2022 at 9:55 AM

## 2022-05-06 NOTE — PROGRESS NOTES
Patient admitted to room from Dr. Renate Whitman. Patient oriented to room, call light, bed rails, phone, lights and bathroom. Patient instructed about the schedule of the day including: vital sign frequency, lab draws, possible tests, frequency of MD and staff rounds, daily weights, I &O's and prescribed diet. Telemetry box in place, patient aware of placement and reason. Bed locked, in lowest position, side rails up 2/4, call light within reach.

## 2022-05-06 NOTE — PROGRESS NOTES
Bedside report and transfer of care given to [facility]. Pt currently resting in bed with the call light within reach. Pt denies any other care needs at this time. Pt stable at this time.

## 2022-05-06 NOTE — PROGRESS NOTES
Pt is a 2 day stress test. Stress portion completed via [employer]. Pt tolerated stress test. Denies current CP. Pt is currently drinking Pepsi while waiting for scan and then will return to assigned room. Pt is no longer NPO for stress test. On 5/7/22 pt will have resting scan.

## 2022-05-06 NOTE — FLOWSHEET NOTE
05/06/22 1730   Vital Signs   Temp 96.9 °F (36.1 °C)   Temp Source Oral   Pulse 93   Heart Rate Source Monitor   Resp 18   BP (!) 143/86   BP Location Left lower arm   MAP (Calculated) 105   Patient Position Sitting   Level of Consciousness Alert (0)   MEWS Score 1   Pain Assessment   Pain Assessment None - Denies Pain   Oxygen Therapy   SpO2 94 %   Pulse Oximeter Device Mode Intermittent   Pulse Oximeter Device Location Left;Finger   O2 Device None (Room air)

## 2022-05-06 NOTE — PLAN OF CARE
Problem: Discharge Planning  Goal: Discharge to home or other facility with appropriate resources  Outcome: Progressing  Flowsheets  Taken 5/6/2022 1801  Discharge to home or other facility with appropriate resources:   Identify barriers to discharge with patient and caregiver   Identify discharge learning needs (meds, wound care, etc)   Refer to discharge planning if patient needs post-hospital services based on physician order or complex needs related to functional status, cognitive ability or social support system   Arrange for needed discharge resources and transportation as appropriate   Arrange for interpreters to assist at discharge as needed  Taken 5/6/2022 1740  Discharge to home or other facility with appropriate resources: Identify barriers to discharge with patient and caregiver

## 2022-05-06 NOTE — ED NOTES
6018- Perfect Serve sent to hospitalist regarding rounding in order for the pt.  To go to Stress test.     Nini Hernandez RN  05/06/22 0349

## 2022-05-06 NOTE — ED NOTES
Report given to PCU RN; pt left ER in stable condition. Blood pressure 137/79, pulse 80, temperature 98 °F (36.7 °C), temperature source Oral, resp.  rate 21, height 5' 10\" (1.778 m), weight (!) 360 lb (163.3 kg), SpO2 90 %.   simona Powell RN  05/06/22 3469

## 2022-05-06 NOTE — CARE COORDINATION
CM reviewed chart and met with patient at bedside to discuss any potential discharge needs. Patient lives with spouse, Martha Franklin, and working full time. No needs identified for discharge intervention at this time. MD and bedside RN  if needs arise please consult case management for discharge intervention. [facility] not following at this time.        Claudio Khoury, RN

## 2022-05-06 NOTE — ED PROVIDER NOTES
[address] ED      CHIEF COMPLAINT  Chest pain, shortness of breath       HISTORY OF PRESENT ILLNESS  Ena Parrish is a 50 y.o. male with a past medical history of HTN, asthma who presents to the ED complaining of shortness of breath and chest discomfort. Recent prednsione for back pain. Onset: yesterday  Pain Location: midsternal   Radiation: denies,   Severity: tightness  Palliative Factors: rest  Provocative Factors: exertion, denies pleuritic or positional chest pain  Shortness of breath: yes  Cough: denies  Fever: denies    Denies history of blood clots or active malignancy. Patient denies unilateral leg swelling, hemoptysis, recent travel or surgery/immobilization, or OCP or other hormone use. Family hx of cardiac disease. They report that their most recent cardiac evaluation was: 2018    Patient does not smoke. Old records reviewed:     2018 stress Test:  Interpetation Summary:      1. Negative ECG for ischemia with graded exercise test.    2. Duke Treadmill Score is 6 which indicates low risk.    3. Negative stress echo with no indication of inducible ischemia.    4. Prolonged run of ventricular bigeminy with exercise. No other complaints, modifying factors or associated symptoms. I have reviewed the following from the nursing documentation. Past Medical History:   Diagnosis Date    Hypertension      Past Surgical History:   Procedure Laterality Date    ANKLE SURGERY Left     CARPAL TUNNEL RELEASE Right     KNEE ARTHROSCOPY Right     TONSILLECTOMY AND ADENOIDECTOMY      TYMPANOSTOMY TUBE PLACEMENT       History reviewed. No pertinent family history.   Social History     Socioeconomic History    Marital status:      Spouse name: Not on file    Number of children: Not on file    Years of education: Not on file    Highest education level: Not on file   Occupational History    Not on file   Tobacco Use    Smoking status: Never Smoker    Smokeless tobacco: Never Used   Substance and Sexual Activity    Alcohol use: Not Currently    Drug use: Not Currently    Sexual activity: Not on file   Other Topics Concern    Not on file   Social History Narrative    Not on file     Social Determinants of Health     Financial Resource Strain:     Difficulty of Paying Living Expenses: Not on file   Food Insecurity:     Worried About Running Out of Food in the Last Year: Not on file    Nicholas of Food in the Last Year: Not on file   Transportation Needs:     Lack of Transportation (Medical): Not on file    Lack of Transportation (Non-Medical):  Not on file   Physical Activity:     Days of Exercise per Week: Not on file    Minutes of Exercise per Session: Not on file   Stress:     Feeling of Stress : Not on file   Social Connections:     Frequency of Communication with Friends and Family: Not on file    Frequency of Social Gatherings with Friends and Family: Not on file    Attends Yarsani Services: Not on file    Active Member of [address] [place] [address] or Organizations: Not on file    Attends Club or Organization Meetings: Not on file    Marital Status: Not on file   Intimate Partner Violence:     Fear of Current or Ex-Partner: Not on file    Emotionally Abused: Not on file    Physically Abused: Not on file    Sexually Abused: Not on file   Housing Stability:     Unable to Pay for Housing in the Last Year: Not on file    Number of [place] in the Last Year: Not on file    Unstable Housing in the Last Year: Not on file     Current Facility-Administered Medications   Medication Dose Route Frequency Provider Last Rate Last Admin    aspirin tablet 325 mg  325 mg Oral Once [place] MD Tony         Current Outpatient Medications   Medication Sig Dispense Refill    Cetirizine HCl (ZYRTEC ALLERGY PO) Take 1 tablet by mouth daily      doxycycline hyclate (VIBRA-TABS) 100 MG tablet Take 100 mg by mouth 2 times daily      Multiple Vitamin (MULTIVITAMIN) tablet Take 1 tablet by mouth daily      lisinopril (PRINIVIL;ZESTRIL) 10 MG tablet Take 10 mg by mouth daily      aspirin 81 MG EC tablet Take 81 mg by mouth daily      Dextromethorphan-guaiFENesin (MUCINEX DM PO) Take by mouth 2 times daily      albuterol sulfate HFA (PROAIR HFA) 108 (90 Base) MCG/ACT inhaler Use every 4 hours 2 puffs while awake for 7-10 days then PRN wheezing  Dispense with SPACER and Instruct on use. May sub Ventolin or Proventil as needed per [employer]. 1 Inhaler 1     Allergies   Allergen Reactions    Ampicillin Other (See Comments)     During childhood      Bee Venom Other (See Comments)    Testosterone      Swelling in lower extremities        REVIEW OF SYSTEMS  All systems reviewed, pertinent positives per HPI otherwise noted to be negative. PHYSICAL EXAM  BP (!) 172/90   Pulse 98   Temp 98 °F (36.7 °C) (Oral)   Resp 20   Ht 5' 10\" (1.778 m)   Wt (!) 360 lb (163.3 kg)   SpO2 92%   BMI 51.65 kg/m²    GENERAL APPEARANCE: Awake and alert. Cooperative. no distress. HENT: Normocephalic. Atraumatic. Mucous membranes are moist  NECK: Supple. Full range of motion of the neck without stiffness or pain. EYES: PERRL. EOM's grossly intact. HEART/CHEST: RRR. No murmurs. Chest wall is not tender to palpation. LUNGS: Respirations unlabored. CTAB. Good air exchange. Speaking comfortably in full sentences. ABDOMEN: No tenderness. Soft. Non-distended. No masses. No organomegaly. No guarding or rebound. MUSCULOSKELETAL: No extremity edema. Compartments soft. No deformity. No tenderness in the extremities. All extremities neurovascularly intact. SKIN: Warm and dry. No acute rashes. NEUROLOGICAL: Alert and oriented. No gross facial drooping. Strength 5/5, sensation intact. PSYCHIATRIC: Normal mood and affect. LABS  I have reviewed all labs for this visit.    Results for orders placed or performed during the hospital encounter of 05/06/22   COVID-19 & Influenza Combo    Specimen: Nasopharyngeal Swab   Result Value Ref Range    SARS-CoV-2 RNA, RT PCR NOT DETECTED NOT DETECTED    INFLUENZA A NOT DETECTED NOT DETECTED    INFLUENZA B NOT DETECTED NOT DETECTED   CBC with Auto Differential   Result Value Ref Range    WBC 10.4 4.0 - 11.0 K/uL    RBC 5.07 4.20 - 5.90 M/uL    Hemoglobin 15.3 13.5 - 17.5 g/dL    Hematocrit 44.0 40.5 - 52.5 %    MCV 86.7 80.0 - 100.0 fL    MCH 30.1 26.0 - 34.0 pg    MCHC 34.7 31.0 - 36.0 g/dL    RDW 13.4 12.4 - 15.4 %    Platelets 569 770 - 016 K/uL    MPV 6.4 5.0 - 10.5 fL    Neutrophils % 67.2 %    Lymphocytes % 25.5 %    Monocytes % 3.9 %    Eosinophils % 3.0 %    Basophils % 0.4 %    Neutrophils Absolute 7.0 1.7 - 7.7 K/uL    Lymphocytes Absolute 2.7 1.0 - 5.1 K/uL    Monocytes Absolute 0.4 0.0 - 1.3 K/uL    Eosinophils Absolute 0.3 0.0 - 0.6 K/uL    Basophils Absolute 0.0 0.0 - 0.2 K/uL   Comprehensive Metabolic Panel   Result Value Ref Range    Sodium 138 136 - 145 mmol/L    Potassium 3.7 3.5 - 5.1 mmol/L    Chloride 102 99 - 110 mmol/L    CO2 25 21 - 32 mmol/L    Anion Gap 11 3 - 16    Glucose 106 (H) 70 - 99 mg/dL    BUN 19 7 - 20 mg/dL    CREATININE 0.7 (L) 0.9 - 1.3 mg/dL    GFR Non-African American >60 >60    GFR African American >60 >60    Calcium 8.7 8.3 - 10.6 mg/dL    Total Protein 6.1 (L) 6.4 - 8.2 g/dL    Albumin 4.0 3.4 - 5.0 g/dL    Albumin/Globulin Ratio 1.9 1.1 - 2.2    Total Bilirubin 0.4 0.0 - 1.0 mg/dL    Alkaline Phosphatase 90 40 - 129 U/L    ALT 31 10 - 40 U/L    AST 15 15 - 37 U/L   Troponin   Result Value Ref Range    Troponin <0.01 <0.01 ng/mL       ECG  The Ekg interpreted by me shows  normal sinus rhythm with a rate of 91  Axis is   Left axis deviation  QTc is  prolonged  Intervals and Durations are unremarkable. ST Segments: nonspecific changes  No significant change from prior EKG dated 7/3/19      RADIOLOGY  XR CHEST PORTABLE   Final Result   Clear chest without acute cardiopulmonary process.                 ED COURSE / [facility]  Patient seen and evaluated. Old records reviewed and pertinent information included in HPI. Labs and imaging reviewed and results discussed with patient. Overall well appearing patient, in no acute distress, presenting for intermittent shortness of breath and chest pain for 1 day. Physical exam unremarkable. Differential diagnosis includes but is not limited to: Pneumonia, pneumothorax, pleural effusion, ACS, CHF exacerbation, COPD exacerbation, asthma, pulmonary embolism, arrhythmia, anemia, COVID    EKG, laboratory studies, and imaging obtained. Workup showed: At this time I have low concern for pulmonary embolism. Patient has not had a previous blood clot. Patient denies other risk factors for pulmonary embolism. Patient does not have any evidence of DVT on exam.  Patient is low risk on PERC and Wells criteria. At this time, considering that risks associated with further work-up for pulmonary embolism outweigh the likelihood of this diagnosis. Low suspicion for aortic pathology. Patient has strong pulses in the bilateral radial and femoral arteries. Pain was not maximal at onset. There is no evidence of mediastinal widening on chest x-ray. Patient does not have any neurologic deficits. The evidence indicates that the patient is very low risk for Aortic Dissection and this is consistent with my clinical intuition. The risk of further workup or hospitalization for aortic dissection is likely higher than the risk of the patient having an aortic dissection. Low suspicion for esophageal perforation. Patient has not had vomiting. There is no crepitus on exam.  No subcutaneous air or pneumomediastinum on chest x-ray.     ED Course as of 05/06/22 0356   Fri May 06, 2022   0250 No leukocytosis, anemia, thrombocytopenia [ER]   0325 COVID and flu swab negative [ER]   0345 Chest x-ray shows no evidence of pneumonia, pneumothorax, pleural effusion, pulmonary edema [ER]   0354 Troponin within normal limits, EKG without evidence of acute ischemia. [ER]   8457 No significant electrolyte abnormalities or evidence of kidney dysfunction. [ER]   0355 Liver function testing unremarkable. [ER]      ED Course User Index  [ER] Rere Wilson MD        During the patient's ED course, the patient was given:  Medications   aspirin tablet 325 mg (has no administration in time range)        EKG showed no evidence of acute ischemia. Troponin was within normal limits. Heart score: 4. This falls under the following category: Score of 4-6, which indicates low/moderate risk for major adverse cardiac event and supports observation with repeated troponins and/or non-invasive testing. We will plan for admission for stress testing. Patient has a history of asthma but states he has never required treatment in the past.  There is no wheezing on exam.  Overall low suspicion for asthma exacerbation. Based on results of work-up, I am concerned for exertional shortness of breath and chest discomfort. At this time, do feel the patient requires admission for further work-up and management. CLINICAL IMPRESSION  1. Shortness of breath    2. Chest pain, unspecified type        Blood pressure 116/76, pulse 86, temperature 98 °F (36.7 °C), temperature source Oral, resp. rate 8, height 5' 10\" (1.778 m), weight (!) 360 lb (163.3 kg), SpO2 94 %. Hayden Kwok was admitted in stable condition. DISCLAIMER: This chart was created using Dragon dictation software. Efforts were made by me to ensure accuracy, however some errors may be present due to limitations of this technology and occasionally words are not transcribed correctly.               Rere Wilson MD  05/06/22 7889

## 2022-05-07 ENCOUNTER — APPOINTMENT (OUTPATIENT)
Dept: NUCLEAR MEDICINE | Age: 47
DRG: 176 | End: 2022-05-07
Payer: COMMERCIAL

## 2022-05-07 LAB
ALBUMIN SERPL-MCNC: 4.1 G/DL (ref 3.4–5)
ALP BLD-CCNC: 83 U/L (ref 40–129)
ALT SERPL-CCNC: 30 U/L (ref 10–40)
ANION GAP SERPL CALCULATED.3IONS-SCNC: 10 MMOL/L (ref 3–16)
AST SERPL-CCNC: 15 U/L (ref 15–37)
BASOPHILS ABSOLUTE: 0 K/UL (ref 0–0.2)
BASOPHILS RELATIVE PERCENT: 0 %
BILIRUB SERPL-MCNC: 0.9 MG/DL (ref 0–1)
BILIRUBIN DIRECT: <0.2 MG/DL (ref 0–0.3)
BILIRUBIN, INDIRECT: ABNORMAL MG/DL (ref 0–1)
BUN BLDV-MCNC: 14 MG/DL (ref 7–20)
CALCIUM SERPL-MCNC: 9.1 MG/DL (ref 8.3–10.6)
CHLORIDE BLD-SCNC: 104 MMOL/L (ref 99–110)
CO2: 26 MMOL/L (ref 21–32)
CREAT SERPL-MCNC: 0.8 MG/DL (ref 0.9–1.3)
EOSINOPHILS ABSOLUTE: 0 K/UL (ref 0–0.6)
EOSINOPHILS RELATIVE PERCENT: 0 %
GFR AFRICAN AMERICAN: >60
GFR NON-AFRICAN AMERICAN: >60
GLUCOSE BLD-MCNC: 96 MG/DL (ref 70–99)
HCT VFR BLD CALC: 44.7 % (ref 40.5–52.5)
HEMOGLOBIN: 15.3 G/DL (ref 13.5–17.5)
LYMPHOCYTES ABSOLUTE: 2.2 K/UL (ref 1–5.1)
LYMPHOCYTES RELATIVE PERCENT: 23 %
MCH RBC QN AUTO: 29.7 PG (ref 26–34)
MCHC RBC AUTO-ENTMCNC: 34.3 G/DL (ref 31–36)
MCV RBC AUTO: 86.7 FL (ref 80–100)
MONOCYTES ABSOLUTE: 0.1 K/UL (ref 0–1.3)
MONOCYTES RELATIVE PERCENT: 1 %
NEUTROPHILS ABSOLUTE: 7.3 K/UL (ref 1.7–7.7)
NEUTROPHILS RELATIVE PERCENT: 76 %
PDW BLD-RTO: 13.4 % (ref 12.4–15.4)
PLATELET # BLD: 194 K/UL (ref 135–450)
PLATELET SLIDE REVIEW: ADEQUATE
PMV BLD AUTO: 6.7 FL (ref 5–10.5)
POTASSIUM REFLEX MAGNESIUM: 4.6 MMOL/L (ref 3.5–5.1)
RBC # BLD: 5.16 M/UL (ref 4.2–5.9)
SLIDE REVIEW: NORMAL
SODIUM BLD-SCNC: 140 MMOL/L (ref 136–145)
TOTAL PROTEIN: 6.3 G/DL (ref 6.4–8.2)
WBC # BLD: 9.6 K/UL (ref 4–11)

## 2022-05-07 PROCEDURE — 96372 THER/PROPH/DIAG INJ SC/IM: CPT

## 2022-05-07 PROCEDURE — 3430000000 HC RX DIAGNOSTIC RADIOPHARMACEUTICAL: Performed by: HOSPITALIST

## 2022-05-07 PROCEDURE — 99226 PR SBSQ OBSERVATION CARE/DAY 35 MINUTES: CPT | Performed by: INTERNAL MEDICINE

## 2022-05-07 PROCEDURE — 2580000003 HC RX 258: Performed by: HOSPITALIST

## 2022-05-07 PROCEDURE — 80076 HEPATIC FUNCTION PANEL: CPT

## 2022-05-07 PROCEDURE — 80048 BASIC METABOLIC PNL TOTAL CA: CPT

## 2022-05-07 PROCEDURE — 6370000000 HC RX 637 (ALT 250 FOR IP)

## 2022-05-07 PROCEDURE — A9502 TC99M TETROFOSMIN: HCPCS | Performed by: HOSPITALIST

## 2022-05-07 PROCEDURE — 6360000002 HC RX W HCPCS: Performed by: HOSPITALIST

## 2022-05-07 PROCEDURE — G0378 HOSPITAL OBSERVATION PER HR: HCPCS

## 2022-05-07 PROCEDURE — 6370000000 HC RX 637 (ALT 250 FOR IP): Performed by: HOSPITALIST

## 2022-05-07 PROCEDURE — 94761 N-INVAS EAR/PLS OXIMETRY MLT: CPT

## 2022-05-07 PROCEDURE — 85025 COMPLETE CBC W/AUTO DIFF WBC: CPT

## 2022-05-07 PROCEDURE — 94640 AIRWAY INHALATION TREATMENT: CPT

## 2022-05-07 PROCEDURE — 36415 COLL VENOUS BLD VENIPUNCTURE: CPT

## 2022-05-07 RX ADMIN — VALSARTAN 80 MG: 80 TABLET, FILM COATED ORAL at 12:43

## 2022-05-07 RX ADMIN — CETIRIZINE HYDROCHLORIDE 10 MG: 10 TABLET ORAL at 12:43

## 2022-05-07 RX ADMIN — ENOXAPARIN SODIUM 40 MG: 100 INJECTION SUBCUTANEOUS at 21:29

## 2022-05-07 RX ADMIN — TETROFOSMIN 34.6 MILLICURIE: 1.38 INJECTION, POWDER, LYOPHILIZED, FOR SOLUTION INTRAVENOUS at 10:44

## 2022-05-07 RX ADMIN — FLUTICASONE PROPIONATE 2 SPRAY: 50 SPRAY, METERED NASAL at 09:00

## 2022-05-07 RX ADMIN — ATORVASTATIN CALCIUM 40 MG: 40 TABLET, FILM COATED ORAL at 21:29

## 2022-05-07 RX ADMIN — PANTOPRAZOLE SODIUM 40 MG: 40 TABLET, DELAYED RELEASE ORAL at 06:36

## 2022-05-07 RX ADMIN — Medication 10 ML: at 08:57

## 2022-05-07 RX ADMIN — ENOXAPARIN SODIUM 40 MG: 100 INJECTION SUBCUTANEOUS at 08:57

## 2022-05-07 RX ADMIN — ASPIRIN 81 MG: 81 TABLET, COATED ORAL at 12:42

## 2022-05-07 RX ADMIN — Medication 2 PUFF: at 19:12

## 2022-05-07 NOTE — PROGRESS NOTES
See PM shift assessment. Denies CP; states that he does have a little chest pressure; denies sob at rest.  Telemetry showing NSR; HR 78. Discussed NPO after MN; voiced understanding. Discussed plans for tomorrow; eating a snack. Call light in reach.

## 2022-05-07 NOTE — PLAN OF CARE
Problem: Discharge Planning  Goal: Discharge to home or other facility with appropriate resources  5/7/2022 0923 by Mitul Mcnair RN  Outcome: Progressing  Problem: Pain  Goal: Verbalizes/displays adequate comfort level or baseline comfort level  Outcome: Progressing

## 2022-05-07 NOTE — PROGRESS NOTES
Progress Note    Admit Date:  5/6/2022    Subjective:  Mr. Laura Gregg was admitted for dyspnea. His stress test is positive. Objective:   /80   Pulse 75   Temp 98 °F (36.7 °C) (Oral)   Resp 20   Ht 5' 10\" (1.778 m)   Wt (!) 360 lb (163.3 kg)   SpO2 93%   BMI 51.65 kg/m²      No intake or output data in the 24 hours ending 05/07/22 1404    Physical Exam:    General appearance: alert, appears stated age and cooperative  Head: Normocephalic, without obvious abnormality, atraumatic  Eyes: conjunctivae/corneas clear. PERRL, EOM's intact.   Neck: no adenopathy, no carotid bruit, no JVD, supple, symmetrical, trachea midline and thyroid not enlarged, symmetric, no tenderness/mass/nodules  Lungs: clear to auscultation bilaterally  Heart: regular rate and rhythm, S1, S2 normal, no murmur, click, rub or gallop  Abdomen: soft, non-tender; bowel sounds normal; no masses,  no organomegaly  Extremities: extremities normal, atraumatic, no cyanosis or edema  Pulses: 2+ and symmetric  Skin: Skin color, texture, turgor normal. No rashes or lesions  Neurologic: Grossly normal    Scheduled Meds:   aspirin  81 mg Oral Daily    cetirizine  10 mg Oral Daily    sodium chloride flush  5-40 mL IntraVENous 2 times per day    enoxaparin  40 mg SubCUTAneous BID    atorvastatin  40 mg Oral Nightly    valsartan  80 mg Oral Daily    pantoprazole  40 mg Oral QAM AC    gabapentin  100 mg Oral TID    budesonide-formoterol  2 puff Inhalation BID    fluticasone  2 spray Each Nostril Daily       Continuous Infusions:   sodium chloride         PRN Meds:  sodium chloride flush, sodium chloride, ondansetron **OR** ondansetron, polyethylene glycol, acetaminophen **OR** acetaminophen      Data:  CBC:   Recent Labs     05/06/22  0220 05/07/22  0819   WBC 10.4 9.6   HGB 15.3 15.3   HCT 44.0 44.7   MCV 86.7 86.7    194     BMP:   Recent Labs     05/06/22  0315 05/07/22  0819    140   K 3.7 4.6    104   CO2 25 26   BUN 19 14 CREATININE 0.7* 0.8*     LIVER PROFILE:   Recent Labs     05/06/22  0315 05/07/22  0819   AST 15 15   ALT 31 30   BILIDIR  --  <0.2   BILITOT 0.4 0.9   ALKPHOS 90 83     Stress test  Conclusions        Summary    Abnormal myocardial perfusion study.    There is a small-medium sized , mild reversible anteroseptal defect,    consistent with ischemia.    Low normal LV systolic function with calculated LVEF of 53%.    There is mild anteroseptal hypokinesis.        Overall findings represent an intermediate risk scan.        Frequent PVCs in pattern of bigeminy noted during stress. NM Cardiac Stress Test Nuclear Imaging   Final Result      XR CHEST PORTABLE   Final Result   Clear chest without acute cardiopulmonary process. CULTURES  Results for Edward Sever (MRN [id number]) as of 5/6/2022 08:13    Ref.  Range 5/6/2022 02:45   INFLUENZA A Latest Ref Range: NOT DETECTED  NOT DETECTED   INFLUENZA B Latest Ref Range: NOT DETECTED  NOT DETECTED   SARS-CoV-2 RNA, RT PCR Latest Ref Range: NOT DETECTED  NOT DETECTED         EKG:  I have reviewed the EKG with the following interpretation:   5/6/22  Normal sinus rhythm  Normal ECG  When compared with ECG of 06-MAY-2022 02:16, (unconfirmed)  No significant change was found     5/6/22  Normal sinus rhythm  Left axis deviation  Abnormal ECG  When compared with ECG of 03-JUL-2019 17:48,  No significant change was found     RADIOLOGY  XR CHEST PORTABLE   Final Result   Clear chest without acute cardiopulmonary process.           NM Cardiac Stress Test Nuclear Imaging    (Results Pending)         Pertinent previous results reviewed   Stress ECHO 3//23/18  Interpetation Summary:      1. Negative ECG for ischemia with graded exercise test.    2. Duke Treadmill Score is 6 which indicates low risk.    3. Negative stress echo with no indication of inducible ischemia.    4. Prolonged run of ventricular bigeminy with exercise.         Stress ECHO 9/10/12  IMPRESSION- Nondiagnostic and unremarkable rest/stress   echocardiogram using echocontrast. The study is nondiagnostic due to   inability to reach targeted heart rate. Assessment:  Principal Problem:    Chest pain  Active Problems:    Reactive airway disease without complication    GERD (gastroesophageal reflux disease)    Back pain    HTN (hypertension)  Resolved Problems:    * No resolved hospital problems. *      Plan:    #Chest pain. Patient came to the ER with some chest tightness and shortness of breath. He had a stress test.  Stress test came back positive showing reversible ischemia. Cardiology consultation. #Hypertension. Blood pressure stable. Continue valsartan. #GERD on PPI. #Reactive airway disease. Stable. #On Neurontin for back pain. Lovenox for DVT prophylaxis.     Sarah Patterson MD, MD 5/7/2022 2:04 PM

## 2022-05-07 NOTE — FLOWSHEET NOTE
05/07/22 0854   Vitals   Temp 97.9 °F (36.6 °C)   Temp Source Oral   Pulse 81   Heart Rate Source Monitor   Resp 20   /78   BP Location Left lower arm   BP Upper/Lower Upper   BP Method Automatic   Patient Position Semi fowlers   Level of Consciousness Alert (0)   MEWS Score 1   Cardiac Rhythm Sinus rhythm;RBBB   Pain Assessment   Pain Assessment None - Denies Pain   Oxygen Therapy   SpO2 92 %   O2 Device None (Room air)   Shift assessment complete. Patients head-toe complete, VS are logged, pt reports no chest pain but has tingling in chest ,but not new had when arrived. Pt aware of stress test today, no other complaints at this time . Call light and bedside table are within reach.

## 2022-05-07 NOTE — PROGRESS NOTES
Consult has been called to Dr. Jcarlos Hernandez on 5/7/22.  Spoke with . 2:01 PM    Chetan Hernandez  5/7/2022

## 2022-05-07 NOTE — FLOWSHEET NOTE
05/07/22 1239   Vitals   Temp 98 °F (36.7 °C)   Temp Source Oral   Pulse 75   Heart Rate Source Monitor   /80   BP Location Left lower arm   BP Upper/Lower Upper   BP Method Automatic   Level of Consciousness Alert (0)   Pain Assessment   Pain Assessment None - Denies Pain   Oxygen Therapy   SpO2 93 %   O2 Device None (Room air)

## 2022-05-08 PROCEDURE — 6370000000 HC RX 637 (ALT 250 FOR IP): Performed by: HOSPITALIST

## 2022-05-08 PROCEDURE — 94761 N-INVAS EAR/PLS OXIMETRY MLT: CPT

## 2022-05-08 PROCEDURE — 94640 AIRWAY INHALATION TREATMENT: CPT

## 2022-05-08 PROCEDURE — G0378 HOSPITAL OBSERVATION PER HR: HCPCS

## 2022-05-08 PROCEDURE — 96372 THER/PROPH/DIAG INJ SC/IM: CPT

## 2022-05-08 PROCEDURE — 6370000000 HC RX 637 (ALT 250 FOR IP)

## 2022-05-08 PROCEDURE — 99225 PR SBSQ OBSERVATION CARE/DAY 25 MINUTES: CPT | Performed by: INTERNAL MEDICINE

## 2022-05-08 PROCEDURE — 99223 1ST HOSP IP/OBS HIGH 75: CPT | Performed by: INTERNAL MEDICINE

## 2022-05-08 PROCEDURE — 6360000002 HC RX W HCPCS: Performed by: HOSPITALIST

## 2022-05-08 PROCEDURE — 2580000003 HC RX 258: Performed by: HOSPITALIST

## 2022-05-08 PROCEDURE — 6370000000 HC RX 637 (ALT 250 FOR IP): Performed by: INTERNAL MEDICINE

## 2022-05-08 RX ORDER — CLOPIDOGREL BISULFATE 75 MG/1
75 TABLET ORAL DAILY
Status: DISCONTINUED | OUTPATIENT
Start: 2022-05-09 | End: 2022-05-09

## 2022-05-08 RX ORDER — CLOPIDOGREL BISULFATE 75 MG/1
600 TABLET ORAL ONCE
Status: COMPLETED | OUTPATIENT
Start: 2022-05-08 | End: 2022-05-08

## 2022-05-08 RX ADMIN — Medication 2 PUFF: at 20:09

## 2022-05-08 RX ADMIN — ENOXAPARIN SODIUM 40 MG: 100 INJECTION SUBCUTANEOUS at 22:09

## 2022-05-08 RX ADMIN — Medication 10 ML: at 22:09

## 2022-05-08 RX ADMIN — CETIRIZINE HYDROCHLORIDE 10 MG: 10 TABLET ORAL at 10:36

## 2022-05-08 RX ADMIN — GABAPENTIN 100 MG: 100 CAPSULE ORAL at 10:36

## 2022-05-08 RX ADMIN — CLOPIDOGREL BISULFATE 600 MG: 75 TABLET ORAL at 12:39

## 2022-05-08 RX ADMIN — ENOXAPARIN SODIUM 40 MG: 100 INJECTION SUBCUTANEOUS at 10:36

## 2022-05-08 RX ADMIN — VALSARTAN 80 MG: 80 TABLET, FILM COATED ORAL at 10:36

## 2022-05-08 RX ADMIN — GABAPENTIN 100 MG: 100 CAPSULE ORAL at 14:33

## 2022-05-08 RX ADMIN — ATORVASTATIN CALCIUM 40 MG: 40 TABLET, FILM COATED ORAL at 22:36

## 2022-05-08 RX ADMIN — Medication 2 PUFF: at 08:14

## 2022-05-08 RX ADMIN — FLUTICASONE PROPIONATE 2 SPRAY: 50 SPRAY, METERED NASAL at 10:38

## 2022-05-08 RX ADMIN — ASPIRIN 81 MG: 81 TABLET, COATED ORAL at 10:36

## 2022-05-08 RX ADMIN — PANTOPRAZOLE SODIUM 40 MG: 40 TABLET, DELAYED RELEASE ORAL at 07:23

## 2022-05-08 NOTE — PROGRESS NOTES
Progress Note    Admit Date:  5/6/2022    Subjective:  Mr. Hawa Braga was admitted for dyspnea. His stress test is positive. No new symptoms. Objective:   /72   Pulse 84   Temp 98 °F (36.7 °C) (Oral)   Resp 18   Ht 5' 10\" (1.778 m)   Wt (!) 360 lb (163.3 kg)   SpO2 92%   BMI 51.65 kg/m²          Intake/Output Summary (Last 24 hours) at 5/8/2022 1133  Last data filed at 5/7/2022 1300  Gross per 24 hour   Intake 240 ml   Output --   Net 240 ml       Physical Exam:    General appearance: alert, appears stated age and cooperative  Head: Normocephalic, without obvious abnormality, atraumatic  Eyes: conjunctivae/corneas clear. PERRL, EOM's intact.   Neck: no adenopathy, no carotid bruit, no JVD, supple, symmetrical, trachea midline and thyroid not enlarged, symmetric, no tenderness/mass/nodules  Lungs: clear to auscultation bilaterally  Heart: regular rate and rhythm, S1, S2 normal, no murmur, click, rub or gallop  Abdomen: soft, non-tender; bowel sounds normal; no masses,  no organomegaly  Extremities: extremities normal, atraumatic, no cyanosis or edema  Pulses: 2+ and symmetric  Skin: Skin color, texture, turgor normal. No rashes or lesions  Neurologic: Grossly normal    Scheduled Meds:   clopidogrel  600 mg Oral Once    [START ON 5/9/2022] clopidogrel  75 mg Oral Daily    aspirin  81 mg Oral Daily    cetirizine  10 mg Oral Daily    sodium chloride flush  5-40 mL IntraVENous 2 times per day    enoxaparin  40 mg SubCUTAneous BID    atorvastatin  40 mg Oral Nightly    valsartan  80 mg Oral Daily    pantoprazole  40 mg Oral QAM AC    gabapentin  100 mg Oral TID    budesonide-formoterol  2 puff Inhalation BID    fluticasone  2 spray Each Nostril Daily       Continuous Infusions:   sodium chloride         PRN Meds:  sodium chloride flush, sodium chloride, ondansetron **OR** ondansetron, polyethylene glycol, acetaminophen **OR** acetaminophen      Data:  CBC:   Recent Labs     05/06/22  0220 05/07/22 0819   WBC 10.4 9.6   HGB 15.3 15.3   HCT 44.0 44.7   MCV 86.7 86.7    194     BMP:   Recent Labs     05/06/22 0315 05/07/22 0819    140   K 3.7 4.6    104   CO2 25 26   BUN 19 14   CREATININE 0.7* 0.8*     LIVER PROFILE:   Recent Labs     05/06/22 0315 05/07/22 0819   AST 15 15   ALT 31 30   BILIDIR  --  <0.2   BILITOT 0.4 0.9   ALKPHOS 90 83     Stress test  Conclusions        Summary    Abnormal myocardial perfusion study.    There is a small-medium sized , mild reversible anteroseptal defect,    consistent with ischemia.    Low normal LV systolic function with calculated LVEF of 53%.    There is mild anteroseptal hypokinesis.        Overall findings represent an intermediate risk scan.        Frequent PVCs in pattern of bigeminy noted during stress. NM Cardiac Stress Test Nuclear Imaging   Final Result      XR CHEST PORTABLE   Final Result   Clear chest without acute cardiopulmonary process. CULTURES  Results for Gaston Qureshi (MRN [id number]) as of 5/6/2022 08:13    Ref.  Range 5/6/2022 02:45   INFLUENZA A Latest Ref Range: NOT DETECTED  NOT DETECTED   INFLUENZA B Latest Ref Range: NOT DETECTED  NOT DETECTED   SARS-CoV-2 RNA, RT PCR Latest Ref Range: NOT DETECTED  NOT DETECTED         EKG:  I have reviewed the EKG with the following interpretation:   5/6/22  Normal sinus rhythm  Normal ECG  When compared with ECG of 06-MAY-2022 02:16, (unconfirmed)  No significant change was found     5/6/22  Normal sinus rhythm  Left axis deviation  Abnormal ECG  When compared with ECG of 03-JUL-2019 17:48,  No significant change was found     RADIOLOGY  XR CHEST PORTABLE   Final Result   Clear chest without acute cardiopulmonary process.           NM Cardiac Stress Test Nuclear Imaging    (Results Pending)         Pertinent previous results reviewed   Stress ECHO 3//23/18  Interpetation Summary:      1. Negative ECG for ischemia with graded exercise test.    2. Scott Treadmill Score is 6 which indicates low risk.    3. Negative stress echo with no indication of inducible ischemia.    4. Prolonged run of ventricular bigeminy with exercise.         Stress ECHO 9/10/12  IMPRESSION- Nondiagnostic and unremarkable rest/stress   echocardiogram using echocontrast. The study is nondiagnostic due to   inability to reach targeted heart rate. Assessment:  Principal Problem:    Chest pain  Active Problems:    Reactive airway disease without complication    GERD (gastroesophageal reflux disease)    Back pain    HTN (hypertension)  Resolved Problems:    * No resolved hospital problems. *      Plan:    #Chest pain. Patient came to the ER with some chest tightness and shortness of breath. He had a stress test.  Stress test came back positive showing reversible ischemia. Cardiology consultation. Plans to go to [facility] for cath in am. Cardiology has started Plavix. #Hypertension. Blood pressure stable. Continue valsartan. #GERD on PPI. #Reactive airway disease. Stable. #On Neurontin for back pain. Lovenox for DVT prophylaxis.     Deisi Mix MD, MD 5/8/2022 11:33 AM

## 2022-05-08 NOTE — CONSULTS
Cardiology Consultation   Date: 5/8/2022  Admit Date:  5/6/2022  Reason for Consultation: Shortness of breath. Consult Requesting Physician: Jewell Stewart MD     Chief Complaint   Patient presents with    Shortness of Breath     Hx: asthma; being on prednisone 8 days; feeling jittery and increased SOB tonight; pt c/o soreness to chest 6/10     HPI:   Mr. Maribell Zepeda is a pleasant 50year old male with a medical history significant for hypertension, GERD, chronic pain pain and reactive air way disease who presents from home with acute shortness of breath. According to patient he was in his usual state of health until Thursday when he began to suffer from progressively worsening shortness of breath and dyspnea on exertion. He had recently completed a course of prednisone for back pain. Father passed away 3 years ago and had heart disease but was congenital.  No tobacco.  Social drinker. No illicit drugs. Patient denies fevers, chest pain, orthopnea, PND, lower extremity edema, abdominal swelling, chills, visual changes, headaches, sore throat, cough, abdominal pain, nausea, vomiting, bleeding, bruising, dysuria, muscle/joint pain, confusion, depression, anxiety, skin lesions, etc.    Emergency Room/Hospital Course:  Patient was evaluated in the ER on 05/06/2022. His CMP was reassuring. His troponin enzymes negative x 2. CBC reassuring. Chest radiograph was reassuring.       Past Medical History:   Diagnosis Date    Arthritis     Asthma     GERD (gastroesophageal reflux disease)     Hypertension         Past Surgical History:   Procedure Laterality Date    ANKLE SURGERY Left     CARPAL TUNNEL RELEASE Right     KNEE ARTHROSCOPY Right     TONSILLECTOMY AND ADENOIDECTOMY      TYMPANOSTOMY TUBE PLACEMENT         Allergies   Allergen Reactions    Ampicillin Other (See Comments)     During childhood      Bee Venom Other (See Comments)    Testosterone      Swelling in lower extremities Social History:  Reviewed. reports that he has never smoked. He has never used smokeless tobacco. He reports previous alcohol use. He reports previous drug use. Family History:  Reviewed. family history is not on file. No premature CAD. Review of System:  All other systems reviewed except for that noted above. Pertinent negatives and positives are:     · General: negative for fever, chills   · Ophthalmic ROS: negative for - eye pain or loss of vision  · ENT ROS: negative for - headaches, sore throat   · Respiratory: negative for - cough, sputum  · Cardiovascular: Reviewed in HPI  · Gastrointestinal: negative for - abdominal pain, diarrhea, N/V  · Hematology: negative for - bleeding, blood clots, bruising or jaundice  · Genito-Urinary:  negative for - Dysuria or incontinence  · Musculoskeletal: negative for - Joint swelling, muscle pain  · Neurological: negative for - confusion, dizziness, headaches   · Psychiatric: No anxiety, no depression. · Dermatological: negative for - rash    Physical Examination:  Vitals:    22 0815   BP:    Pulse:    Resp: 18   Temp:    SpO2: 93%        Intake/Output Summary (Last 24 hours) at 2022 1004  Last data filed at 2022 1300  Gross per 24 hour   Intake 240 ml   Output --   Net 240 ml     In: 240 [P.O.:240]  Out: -    Wt Readings from Last 3 Encounters:   22 (!) 360 lb (163.3 kg)   19 (!) 344 lb (156 kg)     Temp  Av.3 °F (36.8 °C)  Min: 98 °F (36.7 °C)  Max: 98.6 °F (37 °C)  Pulse  Av.5  Min: 73  Max: 80  BP  Min: 106/78  Max: 133/83  SpO2  Av.8 %  Min: 91 %  Max: 94 %    · Telemetry: Sinus rhythm   · Constitutional: Alert. Oriented to person, place, and time. No distress. · Head: Normocephalic and atraumatic. · Mouth/Throat: Lips appear moist. Oropharynx is clear and moist.  · Neck: Neck supple. No lymphadenopathy. No rigidity. No JVD present. · Cardiovascular: Normal rate, regular rhythm.  Normal S1&S2.  · Pulmonary/Chest: Bilateral respiratory sounds present. No respiratory accessory muscle use. No wheezes, No rhonchi. · Abdominal: Soft. Normal bowel sounds present. No distension, No tenderness. No splenomegaly. No hernia. · Musculoskeletal: No tenderness. No edema    · Neurological: Alert and oriented. Cranial nerve II-XII grossly intact. · Skin: Skin is warm and dry. No rash, lesions, ulcerations noted. · Psychiatric: No anxiety nor agitation. Labs:  Reviewed. Recent Labs     05/06/22  0315 05/07/22  0819    140   K 3.7 4.6    104   CO2 25 26   BUN 19 14   CREATININE 0.7* 0.8*     Recent Labs     05/06/22  0220 05/07/22  0819   WBC 10.4 9.6   HGB 15.3 15.3   HCT 44.0 44.7   MCV 86.7 86.7    194     Lab Results   Component Value Date    TROPONINI <0.01 05/06/2022     No results found for: BNP  No results found for: PROTIME, INR  No results found for: CHOL, HDL, TRIG    Diagnostic and imaging results reviewed. ECG: Normal sinus rhythm w/o signs of ongoing ischemia. Echo: 09/10/2022  IMPRESSION- Nondiagnostic and unremarkable rest/stress   echocardiogram using echocontrast. The study is nondiagnostic due to   inability to reach targeted heart rate. Nuclear Stress Test: 05/07/2022   Summary    Abnormal myocardial perfusion study.   Wendie Saul is a small-medium sized , mild reversible anteroseptal defect,    consistent with ischemia.    Low normal LV systolic function with calculated LVEF of 53%.    There is mild anteroseptal hypokinesis.        Overall findings represent an intermediate risk scan.        Frequent PVCs in pattern of bigeminy noted during stress. I independently reviewed the ECG and telemetry.     Scheduled Meds:   aspirin  81 mg Oral Daily    cetirizine  10 mg Oral Daily    sodium chloride flush  5-40 mL IntraVENous 2 times per day    enoxaparin  40 mg SubCUTAneous BID    atorvastatin  40 mg Oral Nightly    valsartan  80 mg Oral Daily    pantoprazole  40 mg Oral QAM AC    gabapentin  100 mg Oral TID    budesonide-formoterol  2 puff Inhalation BID    fluticasone  2 spray Each Nostril Daily     Continuous Infusions:   sodium chloride       PRN Meds:.sodium chloride flush, sodium chloride, ondansetron **OR** ondansetron, polyethylene glycol, acetaminophen **OR** acetaminophen     Assessment:   Patient Active Problem List    Diagnosis Date Noted    Chest pain 05/06/2022    Reactive airway disease without complication 81/02/9364    GERD (gastroesophageal reflux disease) 05/06/2022    Back pain 05/06/2022    HTN (hypertension) 05/06/2022    Shortness of breath       Active Hospital Problems    Diagnosis Date Noted    Chest pain [R07.9] 05/06/2022     Priority: Medium    Reactive airway disease without complication [O77.531] 46/61/6844     Priority: Medium    GERD (gastroesophageal reflux disease) [K21.9] 05/06/2022     Priority: Medium    Back pain [M54.9] 05/06/2022     Priority: Medium    HTN (hypertension) [I10] 05/06/2022     Priority: Medium     Mr. Latricia Small is a pleasant 50year old male with a medical history significant for hypertension, GERD, chronic pain pain and reactive air way disease who presents from home with acute shortness of breath. Problem List:  1. Shortness of breath. Assessment and Plan:  1. Shortness of breath. Mr. Latricia Small is a pleasant 50year old male with a medical history significant for hypertension and obesity who presents from home with shortness of breath. Underwent stress test that showed ischemia (area not described qualitatively). We discussed discharge and follow up with providers at [facility] given no acute plaque rupture however patient would like to be safe and complete work up. We will arrange for [facility]. - No heparin.  - I will start DAPT. Given shortness of breath will start plavix. - Continue high dose statin.  - NPO at midnight.  - LHC tomorrow at [facility].     Thank you for allowing me to participate in the care of Aureliano Mahajan . If you have any questions/comments, please do not hesitate to contact us.     Malu Tom MD  Cardiac Electrophysiology  [address] [facility]  [phone] [facility]

## 2022-05-08 NOTE — FLOWSHEET NOTE
05/08/22 1015   Vital Signs   Temp 98 °F (36.7 °C)   Temp Source Oral   Pulse 84   Heart Rate Source Monitor   Resp 18   /72   BP Location Left lower arm   MAP (Calculated) 91   Patient Position Up in chair   Level of Consciousness Alert (0)   MEWS Score 1   Oxygen Therapy   SpO2 92 %   O2 Device None (Room air)       Shift assessment complete. See flow sheet. Scheduled meds given. See MAR. Patients head-toe complete, VS are logged, and active bowel sound noted in all four quadrants. Patient denies any pain at this time. He is up in the chair with no complaints of chest pains. Patient took his medications whole without difficulties. No further needs  noted at this time. Call light and bedside table are within reach. The bed is locked and is in the lowest position. Avani Young RN

## 2022-05-08 NOTE — PROGRESS NOTES
Teaching done re proper diet and exercise; keeping BP and cholesterol numbers down. States that he used to exercise but no longer but that he is thinking he may try again. Continues to deny CP, tele showing NSR, call light in reach.

## 2022-05-08 NOTE — PLAN OF CARE
Problem: Pain  Goal: Verbalizes/displays adequate comfort level or baseline comfort level  [id number] by Brielle Weiss RN  Outcome: Progressing  5/7/2022 0013 by Mark Garcia RN  Outcome: Progressing     Problem: Skin/Tissue Integrity  Goal: Absence of new skin breakdown  Description: 1. Monitor for areas of redness and/or skin breakdown  2. Assess vascular access sites hourly  3. Every 4-6 hours minimum:  Change oxygen saturation probe site  4. Every 4-6 hours:  If on nasal continuous positive airway pressure, respiratory therapy assess nares and determine need for appliance change or resting period.   Outcome: Progressing

## 2022-05-08 NOTE — PROGRESS NOTES
Shift report received from [facility]. Sitting in chair w/laptop; pleasant; asking for coffee and sandwich. Call light in reach.

## 2022-05-08 NOTE — PROGRESS NOTES
Shift assessment completed. Denies CP; tele showing NSR; HR 78. Watching TV; reminded to deep breathe and cough; voiced understanding.

## 2022-05-09 ENCOUNTER — APPOINTMENT (OUTPATIENT)
Dept: CT IMAGING | Age: 47
DRG: 176 | End: 2022-05-09
Payer: COMMERCIAL

## 2022-05-09 LAB
APTT: 34.9 SEC (ref 26.2–38.6)
HCT VFR BLD CALC: 46.6 % (ref 40.5–52.5)
HEMOGLOBIN: 15.8 G/DL (ref 13.5–17.5)
INR BLD: 1.1 (ref 0.88–1.12)
LV EF: 53 %
LVEF MODALITY: NORMAL
MCH RBC QN AUTO: 29.4 PG (ref 26–34)
MCHC RBC AUTO-ENTMCNC: 33.9 G/DL (ref 31–36)
MCV RBC AUTO: 86.8 FL (ref 80–100)
PDW BLD-RTO: 13.2 % (ref 12.4–15.4)
PLATELET # BLD: 212 K/UL (ref 135–450)
PMV BLD AUTO: 6.7 FL (ref 5–10.5)
PRO-BNP: 16 PG/ML (ref 0–124)
PROTHROMBIN TIME: 12.5 SEC (ref 9.9–12.7)
RBC # BLD: 5.37 M/UL (ref 4.2–5.9)
WBC # BLD: 11.7 K/UL (ref 4–11)

## 2022-05-09 PROCEDURE — 6360000002 HC RX W HCPCS: Performed by: INTERNAL MEDICINE

## 2022-05-09 PROCEDURE — 6370000000 HC RX 637 (ALT 250 FOR IP)

## 2022-05-09 PROCEDURE — 96365 THER/PROPH/DIAG IV INF INIT: CPT

## 2022-05-09 PROCEDURE — G0378 HOSPITAL OBSERVATION PER HR: HCPCS

## 2022-05-09 PROCEDURE — 99233 SBSQ HOSP IP/OBS HIGH 50: CPT | Performed by: INTERNAL MEDICINE

## 2022-05-09 PROCEDURE — 75574 CT ANGIO HRT W/3D IMAGE: CPT

## 2022-05-09 PROCEDURE — 36415 COLL VENOUS BLD VENIPUNCTURE: CPT

## 2022-05-09 PROCEDURE — 2500000003 HC RX 250 WO HCPCS: Performed by: INTERNAL MEDICINE

## 2022-05-09 PROCEDURE — 94761 N-INVAS EAR/PLS OXIMETRY MLT: CPT

## 2022-05-09 PROCEDURE — 85610 PROTHROMBIN TIME: CPT

## 2022-05-09 PROCEDURE — 93306 TTE W/DOPPLER COMPLETE: CPT

## 2022-05-09 PROCEDURE — 96366 THER/PROPH/DIAG IV INF ADDON: CPT

## 2022-05-09 PROCEDURE — 6360000004 HC RX CONTRAST MEDICATION: Performed by: INTERNAL MEDICINE

## 2022-05-09 PROCEDURE — 85730 THROMBOPLASTIN TIME PARTIAL: CPT

## 2022-05-09 PROCEDURE — 6370000000 HC RX 637 (ALT 250 FOR IP): Performed by: HOSPITALIST

## 2022-05-09 PROCEDURE — 6370000000 HC RX 637 (ALT 250 FOR IP): Performed by: INTERNAL MEDICINE

## 2022-05-09 PROCEDURE — 2580000003 HC RX 258: Performed by: HOSPITALIST

## 2022-05-09 PROCEDURE — 96375 TX/PRO/DX INJ NEW DRUG ADDON: CPT

## 2022-05-09 PROCEDURE — 83880 ASSAY OF NATRIURETIC PEPTIDE: CPT

## 2022-05-09 PROCEDURE — 85027 COMPLETE CBC AUTOMATED: CPT

## 2022-05-09 PROCEDURE — 94640 AIRWAY INHALATION TREATMENT: CPT

## 2022-05-09 RX ORDER — POLYETHYLENE GLYCOL 3350 17 G/17G
17 POWDER, FOR SOLUTION ORAL DAILY PRN
Status: CANCELLED | OUTPATIENT
Start: 2022-05-09

## 2022-05-09 RX ORDER — HEPARIN SODIUM 1000 [USP'U]/ML
4400 INJECTION, SOLUTION INTRAVENOUS; SUBCUTANEOUS PRN
Status: DISCONTINUED | OUTPATIENT
Start: 2022-05-09 | End: 2022-05-11

## 2022-05-09 RX ORDER — ONDANSETRON 2 MG/ML
4 INJECTION INTRAMUSCULAR; INTRAVENOUS EVERY 6 HOURS PRN
Status: CANCELLED | OUTPATIENT
Start: 2022-05-09

## 2022-05-09 RX ORDER — ASPIRIN 81 MG/1
81 TABLET ORAL DAILY
Status: CANCELLED | OUTPATIENT
Start: 2022-05-09

## 2022-05-09 RX ORDER — GABAPENTIN 100 MG/1
100 CAPSULE ORAL 3 TIMES DAILY
Status: CANCELLED | OUTPATIENT
Start: 2022-05-09

## 2022-05-09 RX ORDER — BUDESONIDE AND FORMOTEROL FUMARATE DIHYDRATE 80; 4.5 UG/1; UG/1
2 AEROSOL RESPIRATORY (INHALATION) 2 TIMES DAILY
Status: CANCELLED | OUTPATIENT
Start: 2022-05-09

## 2022-05-09 RX ORDER — CETIRIZINE HYDROCHLORIDE 10 MG/1
10 TABLET ORAL DAILY
Status: CANCELLED | OUTPATIENT
Start: 2022-05-09

## 2022-05-09 RX ORDER — VALSARTAN 80 MG/1
80 TABLET ORAL DAILY
Status: CANCELLED | OUTPATIENT
Start: 2022-05-09

## 2022-05-09 RX ORDER — ONDANSETRON 4 MG/1
4 TABLET, ORALLY DISINTEGRATING ORAL EVERY 8 HOURS PRN
Status: CANCELLED | OUTPATIENT
Start: 2022-05-09

## 2022-05-09 RX ORDER — SODIUM CHLORIDE 0.9 % (FLUSH) 0.9 %
5-40 SYRINGE (ML) INJECTION EVERY 12 HOURS SCHEDULED
OUTPATIENT
Start: 2022-05-09

## 2022-05-09 RX ORDER — ACETAMINOPHEN 650 MG/1
650 SUPPOSITORY RECTAL EVERY 6 HOURS PRN
Status: CANCELLED | OUTPATIENT
Start: 2022-05-09

## 2022-05-09 RX ORDER — FLUTICASONE PROPIONATE 50 MCG
2 SPRAY, SUSPENSION (ML) NASAL DAILY
Status: CANCELLED | OUTPATIENT
Start: 2022-05-09

## 2022-05-09 RX ORDER — SODIUM CHLORIDE 0.9 % (FLUSH) 0.9 %
5-40 SYRINGE (ML) INJECTION PRN
OUTPATIENT
Start: 2022-05-09

## 2022-05-09 RX ORDER — PANTOPRAZOLE SODIUM 40 MG/1
40 TABLET, DELAYED RELEASE ORAL
Status: CANCELLED | OUTPATIENT
Start: 2022-05-10

## 2022-05-09 RX ORDER — SODIUM CHLORIDE 0.9 % (FLUSH) 0.9 %
5-40 SYRINGE (ML) INJECTION EVERY 12 HOURS SCHEDULED
Status: CANCELLED | OUTPATIENT
Start: 2022-05-09

## 2022-05-09 RX ORDER — HEPARIN SODIUM 1000 [USP'U]/ML
8700 INJECTION, SOLUTION INTRAVENOUS; SUBCUTANEOUS PRN
Status: DISCONTINUED | OUTPATIENT
Start: 2022-05-09 | End: 2022-05-11

## 2022-05-09 RX ORDER — ENOXAPARIN SODIUM 100 MG/ML
40 INJECTION SUBCUTANEOUS 2 TIMES DAILY
Status: CANCELLED | OUTPATIENT
Start: 2022-05-09

## 2022-05-09 RX ORDER — ACETAMINOPHEN 325 MG/1
650 TABLET ORAL EVERY 6 HOURS PRN
Status: CANCELLED | OUTPATIENT
Start: 2022-05-09

## 2022-05-09 RX ORDER — METOPROLOL TARTRATE 5 MG/5ML
2.5 INJECTION INTRAVENOUS EVERY 5 MIN PRN
Status: DISCONTINUED | OUTPATIENT
Start: 2022-05-09 | End: 2022-05-11 | Stop reason: HOSPADM

## 2022-05-09 RX ORDER — HEPARIN SODIUM 1000 [USP'U]/ML
8700 INJECTION, SOLUTION INTRAVENOUS; SUBCUTANEOUS ONCE
Status: COMPLETED | OUTPATIENT
Start: 2022-05-09 | End: 2022-05-09

## 2022-05-09 RX ORDER — CLOPIDOGREL BISULFATE 75 MG/1
75 TABLET ORAL DAILY
Status: CANCELLED | OUTPATIENT
Start: 2022-05-09

## 2022-05-09 RX ORDER — ATORVASTATIN CALCIUM 40 MG/1
40 TABLET, FILM COATED ORAL NIGHTLY
Status: CANCELLED | OUTPATIENT
Start: 2022-05-09

## 2022-05-09 RX ORDER — HEPARIN SODIUM 10000 [USP'U]/100ML
2400 INJECTION, SOLUTION INTRAVENOUS CONTINUOUS
Status: DISCONTINUED | OUTPATIENT
Start: 2022-05-09 | End: 2022-05-11

## 2022-05-09 RX ORDER — SODIUM CHLORIDE 9 MG/ML
INJECTION, SOLUTION INTRAVENOUS PRN
OUTPATIENT
Start: 2022-05-09

## 2022-05-09 RX ORDER — SODIUM CHLORIDE 9 MG/ML
INJECTION, SOLUTION INTRAVENOUS PRN
Status: CANCELLED | OUTPATIENT
Start: 2022-05-09

## 2022-05-09 RX ORDER — SODIUM CHLORIDE 0.9 % (FLUSH) 0.9 %
5-40 SYRINGE (ML) INJECTION PRN
Status: CANCELLED | OUTPATIENT
Start: 2022-05-09

## 2022-05-09 RX ADMIN — Medication 10 ML: at 20:45

## 2022-05-09 RX ADMIN — PANTOPRAZOLE SODIUM 40 MG: 40 TABLET, DELAYED RELEASE ORAL at 06:56

## 2022-05-09 RX ADMIN — METOPROLOL TARTRATE 2.5 MG: 1 INJECTION, SOLUTION INTRAVENOUS at 16:48

## 2022-05-09 RX ADMIN — Medication 2 PUFF: at 21:33

## 2022-05-09 RX ADMIN — GABAPENTIN 100 MG: 100 CAPSULE ORAL at 20:43

## 2022-05-09 RX ADMIN — IOPAMIDOL 140 ML: 755 INJECTION, SOLUTION INTRAVENOUS at 16:41

## 2022-05-09 RX ADMIN — ASPIRIN 81 MG: 81 TABLET, COATED ORAL at 09:42

## 2022-05-09 RX ADMIN — METOPROLOL TARTRATE 2.5 MG: 1 INJECTION, SOLUTION INTRAVENOUS at 16:33

## 2022-05-09 RX ADMIN — Medication 2 PUFF: at 08:16

## 2022-05-09 RX ADMIN — CLOPIDOGREL BISULFATE 75 MG: 75 TABLET ORAL at 09:42

## 2022-05-09 RX ADMIN — HEPARIN SODIUM 1960 UNITS/HR: 10000 INJECTION, SOLUTION INTRAVENOUS; SUBCUTANEOUS at 20:47

## 2022-05-09 RX ADMIN — METOPROLOL TARTRATE 2.5 MG: 1 INJECTION, SOLUTION INTRAVENOUS at 16:43

## 2022-05-09 RX ADMIN — METOPROLOL TARTRATE 25 MG: 25 TABLET, FILM COATED ORAL at 12:37

## 2022-05-09 RX ADMIN — HEPARIN SODIUM 8700 UNITS: 1000 INJECTION INTRAVENOUS; SUBCUTANEOUS at 20:44

## 2022-05-09 RX ADMIN — VALSARTAN 80 MG: 80 TABLET, FILM COATED ORAL at 09:42

## 2022-05-09 RX ADMIN — GABAPENTIN 100 MG: 100 CAPSULE ORAL at 09:42

## 2022-05-09 RX ADMIN — CETIRIZINE HYDROCHLORIDE 10 MG: 10 TABLET ORAL at 09:42

## 2022-05-09 NOTE — FLOWSHEET NOTE
05/09/22 1902   Handoff   Communication Given Shift Handoff   Handoff Given To [employer] Received From [facility] Communication Telephone   Time Handoff Given 1902   End of Shift Check Performed Yes       Patient being transferred to PCU.

## 2022-05-09 NOTE — FLOWSHEET NOTE
05/09/22 1230   Vital Signs   Pulse 80   Heart Rate Source Monitor   /71   BP Location Left lower arm   MAP (Calculated) 85.67   Oxygen Therapy   SpO2 94 %   O2 Device None (Room air)         1x dose metoprolol given.

## 2022-05-09 NOTE — PROGRESS NOTES
Progress Note    Admit Date:  5/6/2022    Subjective:  Mr. Octavio Hurst was admitted for dyspnea. His stress test is positive. No new symptoms. Keep n.p.o. after midnight with plans on doing cardiac catheter today;  patient has been evaluated for cardiology-decision to do CT chest and echocardiogram first    Objective:   /81   Pulse 78   Temp 97.4 °F (36.3 °C) (Oral)   Resp 16   Ht 5' 10\" (1.778 m)   Wt (!) 360 lb (163.3 kg)   SpO2 96%   BMI 51.65 kg/m²        No intake or output data in the 24 hours ending 05/09/22 1005    Physical Exam:    General appearance: alert, appears stated age and cooperative  Head: Normocephalic, without obvious abnormality, atraumatic  Eyes: conjunctivae/corneas clear. PERRL, EOM's intact.   Neck: no adenopathy, no carotid bruit, no JVD, supple, symmetrical, trachea midline and thyroid not enlarged, symmetric, no tenderness/mass/nodules  Lungs: clear to auscultation bilaterally  Heart: regular rate and rhythm, S1, S2 normal, no murmur, click, rub or gallop  Abdomen: soft, non-tender; bowel sounds normal; no masses,  no organomegaly  Extremities: extremities normal, atraumatic, no cyanosis or edema  Pulses: 2+ and symmetric  Skin: Skin color, texture, turgor normal. No rashes or lesions  Neurologic: Grossly normal    Scheduled Meds:   clopidogrel  75 mg Oral Daily    aspirin  81 mg Oral Daily    cetirizine  10 mg Oral Daily    sodium chloride flush  5-40 mL IntraVENous 2 times per day    enoxaparin  40 mg SubCUTAneous BID    atorvastatin  40 mg Oral Nightly    valsartan  80 mg Oral Daily    pantoprazole  40 mg Oral QAM AC    gabapentin  100 mg Oral TID    budesonide-formoterol  2 puff Inhalation BID    fluticasone  2 spray Each Nostril Daily       Continuous Infusions:   sodium chloride         PRN Meds:  sodium chloride flush, sodium chloride, ondansetron **OR** ondansetron, polyethylene glycol, acetaminophen **OR** acetaminophen      Data:  CBC:   Recent Labs 05/07/22 0819   WBC 9.6   HGB 15.3   HCT 44.7   MCV 86.7        BMP:   Recent Labs     05/07/22 0819      K 4.6      CO2 26   BUN 14   CREATININE 0.8*     LIVER PROFILE:   Recent Labs     05/07/22 0819   AST 15   ALT 30   BILIDIR <0.2   BILITOT 0.9   ALKPHOS 83     Stress test  Conclusions        Summary    Abnormal myocardial perfusion study.    There is a small-medium sized , mild reversible anteroseptal defect,    consistent with ischemia.    Low normal LV systolic function with calculated LVEF of 53%.    There is mild anteroseptal hypokinesis.        Overall findings represent an intermediate risk scan.        Frequent PVCs in pattern of bigeminy noted during stress. NM Cardiac Stress Test Nuclear Imaging   Final Result      XR CHEST PORTABLE   Final Result   Clear chest without acute cardiopulmonary process. CULTURES  Results for Gina Peck (MRN [id number]) as of 5/6/2022 08:13    Ref.  Range 5/6/2022 02:45   INFLUENZA A Latest Ref Range: NOT DETECTED  NOT DETECTED   INFLUENZA B Latest Ref Range: NOT DETECTED  NOT DETECTED   SARS-CoV-2 RNA, RT PCR Latest Ref Range: NOT DETECTED  NOT DETECTED         EKG:  I have reviewed the EKG with the following interpretation:   5/6/22  Normal sinus rhythm  Normal ECG  When compared with ECG of 06-MAY-2022 02:16, (unconfirmed)  No significant change was found     5/6/22  Normal sinus rhythm  Left axis deviation  Abnormal ECG  When compared with ECG of 03-JUL-2019 17:48,  No significant change was found     RADIOLOGY  XR CHEST PORTABLE   Final Result   Clear chest without acute cardiopulmonary process.           NM Cardiac Stress Test Nuclear Imaging    (Results Pending)         Pertinent previous results reviewed   Stress ECHO 3//23/18  Interpetation Summary:      1. Negative ECG for ischemia with graded exercise test.    2. Duke Treadmill Score is 6 which indicates low risk.    3. Negative stress echo with no indication of inducible ischemia.    4. Prolonged run of ventricular bigeminy with exercise.         Stress ECHO 9/10/12  IMPRESSION- Nondiagnostic and unremarkable rest/stress   echocardiogram using echocontrast. The study is nondiagnostic due to   inability to reach targeted heart rate. Assessment:  Principal Problem:    Chest pain  Active Problems:    Reactive airway disease without complication    GERD (gastroesophageal reflux disease)    Back pain    HTN (hypertension)  Resolved Problems:    * No resolved hospital problems. *      Plan:    #Chest pain.    -Patient came to the ER with some chest tightness and shortness of breath. He had a stress test.  Stress test came back positive showing reversible ischemia.    -Cardiology consultation. Started on Plavix . Initial plan was to go to emergency for cardiac cath ; reevaluated by cardiology today ; cardiac CT chest and echocardiogram ordered . #Hypertension.   - Blood pressure stable. Continue valsartan. #GERD on PPI. #Reactive airway disease. Stable. #On Neurontin for back pain. Lovenox for DVT prophylaxis. await cardiac CTA chest , echo       Huber Gorman MD 5/9/2022 10:05 AM    Addendum 7 pm   cardiac CTA   Bilateral pulmonary emboli, involving the distal right, and distal left main   pulmonary arteries extending into the segmental branches. No significant dilatation of the right ventricle or bowing of the   intraventricular septum.  This result was discussed with Dr. Keegan Jeffries by Dr. Jim Ndiaye at 5:35 pm.     Phase misregistration artifact limits the study. This decreases sensitivity   and specificity of the study. Scattered plaque-like luminal irregularities   are seen throughout the coronary arteries.  Small amount of noncalcified   plaque is seen in the proximal LAD with close to 50% partial obscuration of   the lumen, which could be exaggerated by artifact.  Calcium score is 15.  No   significant plaque or flow limiting stenosis noted in the right coronary   artery or circumflex       I Discussed with cardiology  Patient started on heparin drip, transferred to PCU    Brielle Benjamin MD

## 2022-05-09 NOTE — PROGRESS NOTES
Notified by radiology tech of concern for bilateral PE by CTA cardiac. Images personally reviewed and I agree there appear to be filling defects in the left and right branching PAs. I was called by radiology MD short while later confirming this. Radiology MD states evidence of right heart strain. Troponins have been negative. No chest pain. No oxygen requirement. Sending proBNP, RV appeared normal size and function by echocardiogram.  No evidence of right heart strain from my standpoint - Submassive PE. Symptoms started Thursday. We will treat conservatively with heparin drip, no plans for catheter directed lysis at this time. Patient will need venous Dopplers, hematologic work-up with [facility] on outpatient basis, age-appropriate cancer screening and at this point with unprovoked PE he was counseled to likely need lifelong anticoagulation. Coronary read portion of the CTA will be read tomorrow and we will follow this up. At this point I believe his acute onset dyspnea on Thursday morning was due to onset of pulmonary emboli. Updated patient and family at bedside. Discussed treatment plan with RN. ILDEFONSO dual antiplatelet therapy.   Start heparin    Xiang Quinn MD, [address] [facility]  [phone] [facility]  [phone] [facility]

## 2022-05-09 NOTE — FLOWSHEET NOTE
05/09/22 0930   Vital Signs   Temp 97.4 °F (36.3 °C)   Temp Source Oral   Pulse 78   Heart Rate Source Monitor   Resp 16   /81   BP Location Left lower arm   MAP (Calculated) 93   Patient Position Semi fowlers   Level of Consciousness Alert (0)   MEWS Score 1   Oxygen Therapy   SpO2 96 %   O2 Device None (Room air)         Shift assessment complete. See flow sheet. Scheduled meds given. See MAR. Patients head-toe complete, VS are logged, and active bowel sound noted in all four quadrants. PO medications given per MD erwin. Patient denies any chest pain at this time. Patient has been NPO. Dr. Kohli Goes at bedside. No further needs  noted at this time. Call light and bedside table are within reach. The bed is locked and is in the lowest position. Jacqui Jimenez RN

## 2022-05-09 NOTE — PROGRESS NOTES
This RN tried to call & LVM to CT at [phone]. Per Dr. Seun Rao telephone conversation it was noted that he wanted the CT completed ASAP. At 0 Jessica from CT called stating that they were not sure if they were able to get patient down for scan that he needs an IV in his LAC either an #18 or #20. Also his HR needed to be below 60. This RN explained that PO metoprolol was already given and the IV metoprolol was for CT. Jessica stated that \"her nurses don't  just sit around all day, that patient would need to be scheduled. \"    This RN informed Jessica that she would reach out to Clinical to get this issue solved so we call can work together to get the patient to the procedure ASAP. This RN reached out to [employer], Clinical, and Cassandra supervisor.

## 2022-05-09 NOTE — PROGRESS NOTES
2 VM left for both echo and CT to see if/when patient will be going for procedure. Secure message sent to MD requesting diet for patient.

## 2022-05-09 NOTE — PLAN OF CARE
Problem: Pain  Goal: Verbalizes/displays adequate comfort level or baseline comfort level  Outcome: Progressing     Problem: Skin/Tissue Integrity  Goal: Absence of new skin breakdown  Description: 1. Monitor for areas of redness and/or skin breakdown  2. Assess vascular access sites hourly  3. Every 4-6 hours minimum:  Change oxygen saturation probe site  4. Every 4-6 hours:  If on nasal continuous positive airway pressure, respiratory therapy assess nares and determine need for appliance change or resting period.   Outcome: Progressing

## 2022-05-09 NOTE — PROGRESS NOTES
See PM shift assessment. Watching TV; denies CP; sob; call light in reach. NPO after MN; voiced understanding.

## 2022-05-09 NOTE — PROGRESS NOTES
CARDIOLOGY PROGRESS NOTE      Patient Name: Raysa Haynes  Date of admission: 5/6/2022  2:10 AM  Admission Dx: Shortness of breath [R06.02]  Chest pain [R07.9]  Chest pain, unspecified type [R07.9]  Reason for Consult:  Chest pain   Requesting Physician: Li Rosales MD  Primary Care physician: Miguelangel Hernandez MD    Subjective:     Raysa Haynes is a 50 y.o. patient with a prior history notable for hypertension, GERD, reactive airway disease for who presented to the hospital with complaints of worsening shortness of breath. Cardiology consulted for further evaluation. Pt initially evaluated with my partner 5/8 Dr. Dillon Amaro who noted acute onset shortness of breath, abnormal stress test, cardiac catheterization recommended. Started on DAPT. No heparin. Of note, Father passed away 3 years ago and had heart disease but was congenital.  No tobacco.  Social drinker. No illicit drugs. Reports today breathing is fine. No chest pain/pressure/heaviness with chopping wood, working on the farm and other heavy exertion. His dyspnea was acute in onset on day of admission. No history of VTE/leg pains/chest pain or recent trips/prolonged immobility. No edema/PND/Orthopnea.        Home Medications:  Were reviewed and are listed in nursing record and/or below  Prior to Admission medications    Medication Sig Start Date End Date Taking? Authorizing Provider   fluticasone (FLONASE) 50 MCG/ACT nasal spray 2 sprays by Each Nostril route daily   Yes Historical Provider, MD   fluticasone-vilanterol (BREO ELLIPTA) 100-25 MCG/INH AEPB inhaler Inhale 1 puff into the lungs daily   Yes Historical Provider, MD   valsartan (DIOVAN) 80 MG tablet Take 80 mg by mouth daily   Yes Historical Provider, MD   omeprazole (PRILOSEC) 20 MG delayed release capsule Take 40 mg by mouth daily   Yes Historical Provider, MD   gabapentin (NEURONTIN) 100 MG capsule Take 100 mg by mouth 3 times daily.  As needed   Yes Historical Provider, MD   Cetirizine HCl (ZYRTEC ALLERGY PO) Take 1 tablet by mouth daily    Historical Provider, MD   doxycycline hyclate (VIBRA-TABS) 100 MG tablet Take 100 mg by mouth 2 times daily  Patient not taking: Reported on 5/6/2022 7/1/19   Historical Provider, MD   Multiple Vitamin (MULTIVITAMIN) tablet Take 1 tablet by mouth daily    Historical Provider, MD   lisinopril (PRINIVIL;ZESTRIL) 10 MG tablet Take 10 mg by mouth daily  Patient not taking: Reported on 5/6/2022 6/18/19   Historical Provider, MD   aspirin 81 MG EC tablet Take 81 mg by mouth daily    Historical Provider, MD   Dextromethorphan-guaiFENesin ([place] 598 DM PO) Take by mouth 2 times daily    Historical Provider, MD   albuterol sulfate HFA (PROAIR HFA) 108 (90 Base) MCG/ACT inhaler Use every 4 hours 2 puffs while awake for 7-10 days then PRN wheezing  Dispense with SPACER and Instruct on use. May sub Ventolin or Proventil as needed per [facility].   Patient not taking: Reported on 5/6/2022 7/3/19   Rima Burnette PA-C        CURRENT Medications:  clopidogrel (PLAVIX) tablet 75 mg, Daily  aspirin EC tablet 81 mg, Daily  cetirizine (ZYRTEC) tablet 10 mg, Daily  sodium chloride flush 0.9 % injection 5-40 mL, 2 times per day  sodium chloride flush 0.9 % injection 5-40 mL, PRN  0.9 % sodium chloride infusion, PRN  enoxaparin (LOVENOX) injection 40 mg, BID  ondansetron (ZOFRAN-ODT) disintegrating tablet 4 mg, Q8H PRN   Or  ondansetron (ZOFRAN) injection 4 mg, Q6H PRN  polyethylene glycol (GLYCOLAX) packet 17 g, Daily PRN  acetaminophen (TYLENOL) tablet 650 mg, Q6H PRN   Or  acetaminophen (TYLENOL) suppository 650 mg, Q6H PRN  atorvastatin (LIPITOR) tablet 40 mg, Nightly  valsartan (DIOVAN) tablet 80 mg, Daily  pantoprazole (PROTONIX) tablet 40 mg, QAM AC  gabapentin (NEURONTIN) capsule 100 mg, TID  budesonide-formoterol (SYMBICORT) 80-4.5 MCG/ACT inhaler 2 puff, BID  fluticasone (FLONASE) 50 MCG/ACT nasal spray 2 spray, Daily        Allergies:  Ampicillin, Bee venom, and Testosterone     Review of Systems:   A 14 point review of symptoms completed. Pertinent positives identified in the HPI, all other review of symptoms negative. Objective:     Vitals:    05/08/22 2009 05/09/22 0252 05/09/22 0816 05/09/22 0930   BP:  106/71  117/81   Pulse:  84  78   Resp:  16 16 16   Temp:  97.6 °F (36.4 °C)  97.4 °F (36.3 °C)   TempSrc:  Oral  Oral   SpO2: 93% 94% 94% 96%   Weight:       Height:          Weight: (!) 360 lb (163.3 kg)       PHYSICAL EXAM:    General:  Middle aged man in NAD   Head:  Normocephalic, atraumatic   Eyes:  Conjunctiva/corneas clear, anicteric sclerae    Nose: Nares normal, no drainage or sinus tenderness   Throat: No abnormalities of the lips, oral mucosa or tongue. Neck: Trachea midline. Neck supple with no lymphadenopathy, thyroid not enlarged, symmetric, no tenderness/mass/nodules    Lungs:   Clear to auscultation bilaterally, no wheezes, no rales, no respiratory distress   Chest Wall:  No deformity or tenderness to palpation   Heart:  Regular rate and rhythm, normal S1, normal S2, no murmur, no rub, no S3/S4, PMI non-displaced. Abdomen:   Obese, Soft, non-tender, with normoactive bowel sounds. No masses, no hepatosplenomegaly   Extremities: No cyanosis, clubbing or pitting edema. Vascular: 2+ radial, 2+dorsalis pedis and posterior tibial pulses bilaterally. Brisk carotid upstrokes without carotid bruit. Skin: Skin color, texture, turgor are normal with no rashes or ulceration. Pysch: Euthymic mood, appropriate affect   Neurologic: Oriented to person, place and time. No slurred speech or facial asymmetry. No motor or sensory deficits on gross examination.          Labs:   CBC:   Lab Results   Component Value Date    WBC 9.6 05/07/2022    RBC 5.16 05/07/2022    HGB 15.3 05/07/2022    HCT 44.7 05/07/2022    MCV 86.7 05/07/2022    RDW 13.4 05/07/2022     05/07/2022     CMP:  Lab Results   Component Value Date     05/07/2022    K 4.6 05/07/2022     05/07/2022    CO2 26 05/07/2022    BUN 14 05/07/2022    CREATININE 0.8 05/07/2022    GFRAA >60 05/07/2022    AGRATIO 1.9 05/06/2022    LABGLOM >60 05/07/2022    GLUCOSE 96 05/07/2022    PROT 6.3 05/07/2022    CALCIUM 9.1 05/07/2022    BILITOT 0.9 05/07/2022    ALKPHOS 83 05/07/2022    AST 15 05/07/2022    ALT 30 05/07/2022     PT/INR:  No results found for: PTINR  HgBA1c:No results found for: LABA1C  Lab Results   Component Value Date    TROPONINI <0.01 05/06/2022         Interval Testing/Data:     Telemetry personally reviewed: NSR with occasional bigeminy. Nuclear Stress Test: 05/07/2022   Summary    Abnormal myocardial perfusion study.   Delon Cox is a small-medium sized , mild reversible anteroseptal defect,    consistent with ischemia.    Low normal LV systolic function with calculated LVEF of 53%.    There is mild anteroseptal hypokinesis.        Overall findings represent an intermediate risk scan.        Frequent PVCs in pattern of bigeminy noted during stress. Stress echo 2018  Interpetation Summary:      1. Negative ECG for ischemia with graded exercise test.    2. Duke Treadmill Score is 6 which indicates low risk.    3. Negative stress echo with no indication of inducible ischemia.    4. Prolonged run of ventricular bigeminy with exercise. Impression and Plan      Acute onset dyspnea  Abnormal stress test with possible LAD territory ischemia. PVC's/bigeminy - noted by prior stress 2018 as well  -plan for transfer [facility] for [facility]; timing TBD - awaiting all back from interventional, anticipate this [facility] this PM. Keep NPO. -with family history of congenital heart disease in his father who had valvular replacement surgery in 50's, a complete resting echo is indicated. Ensure to evidence for BAV. Pt checking with his PCP to see what his father (who shared same PCP) had history of exactly. Hypertension, controlled   Obesity with BMI 52  -OP sleep study is planned. IRBBB  Abnormal EKG        Patient Active Problem List   Diagnosis    Chest pain    Reactive airway disease without complication    GERD (gastroesophageal reflux disease)    Back pain    HTN (hypertension)    Shortness of breath           I will address the patient's cardiac risk factors and adjusted pharmacologic treatment as needed. In addition, I have reinforced the need for patient directed risk factor modification. All questions and concerns were addressed to the patient/family. Alternatives to my treatment were discussed. Thank you for allowing us to participate in the care of Shaila Bacon. Please call me with any questions [phone]. 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 Neg CE's. EKG non-ischemia. [employer] [place] [employer] man with little risk factors. PVC's/bigminy appear chronic. Will be good cardiac CTA candidate to start- if normal, and TTE reassuring, no need for transfer. If CTA concerns, we can then transfer for [facility]. Will place new orders for cardiac CTA, metoprolol PO x 1 this AM for HR goal < 60 with study, IV metoprolol as needed for use to get to goal for CT. Case discussed with TRUDY HAJI.

## 2022-05-10 ENCOUNTER — APPOINTMENT (OUTPATIENT)
Dept: VASCULAR LAB | Age: 47
DRG: 176 | End: 2022-05-10
Payer: COMMERCIAL

## 2022-05-10 LAB
ANTI-XA UNFRAC HEPARIN: 0.15 IU/ML (ref 0.3–0.7)
ANTI-XA UNFRAC HEPARIN: 0.25 IU/ML (ref 0.3–0.7)
ANTI-XA UNFRAC HEPARIN: 0.3 IU/ML (ref 0.3–0.7)
ANTI-XA UNFRAC HEPARIN: 0.33 IU/ML (ref 0.3–0.7)

## 2022-05-10 PROCEDURE — 85613 RUSSELL VIPER VENOM DILUTED: CPT

## 2022-05-10 PROCEDURE — 85300 ANTITHROMBIN III ACTIVITY: CPT

## 2022-05-10 PROCEDURE — 99223 1ST HOSP IP/OBS HIGH 75: CPT | Performed by: INTERNAL MEDICINE

## 2022-05-10 PROCEDURE — 81241 F5 GENE: CPT

## 2022-05-10 PROCEDURE — 2580000003 HC RX 258: Performed by: INTERNAL MEDICINE

## 2022-05-10 PROCEDURE — 6360000002 HC RX W HCPCS: Performed by: INTERNAL MEDICINE

## 2022-05-10 PROCEDURE — 81240 F2 GENE: CPT

## 2022-05-10 PROCEDURE — 85520 HEPARIN ASSAY: CPT

## 2022-05-10 PROCEDURE — 85303 CLOT INHIBIT PROT C ACTIVITY: CPT

## 2022-05-10 PROCEDURE — 94761 N-INVAS EAR/PLS OXIMETRY MLT: CPT

## 2022-05-10 PROCEDURE — 85307 ASSAY ACTIVATED PROTEIN C: CPT

## 2022-05-10 PROCEDURE — 85610 PROTHROMBIN TIME: CPT

## 2022-05-10 PROCEDURE — 85306 CLOT INHIBIT PROT S FREE: CPT

## 2022-05-10 PROCEDURE — 96366 THER/PROPH/DIAG IV INF ADDON: CPT

## 2022-05-10 PROCEDURE — 99232 SBSQ HOSP IP/OBS MODERATE 35: CPT | Performed by: INTERNAL MEDICINE

## 2022-05-10 PROCEDURE — 83090 ASSAY OF HOMOCYSTEINE: CPT

## 2022-05-10 PROCEDURE — 93970 EXTREMITY STUDY: CPT

## 2022-05-10 PROCEDURE — 6370000000 HC RX 637 (ALT 250 FOR IP): Performed by: HOSPITALIST

## 2022-05-10 PROCEDURE — 86146 BETA-2 GLYCOPROTEIN ANTIBODY: CPT

## 2022-05-10 PROCEDURE — 99232 SBSQ HOSP IP/OBS MODERATE 35: CPT | Performed by: NURSE PRACTITIONER

## 2022-05-10 PROCEDURE — 85730 THROMBOPLASTIN TIME PARTIAL: CPT

## 2022-05-10 PROCEDURE — 2580000003 HC RX 258: Performed by: HOSPITALIST

## 2022-05-10 PROCEDURE — 6370000000 HC RX 637 (ALT 250 FOR IP)

## 2022-05-10 PROCEDURE — 2060000000 HC ICU INTERMEDIATE R&B

## 2022-05-10 PROCEDURE — 86147 CARDIOLIPIN ANTIBODY EA IG: CPT

## 2022-05-10 PROCEDURE — 94640 AIRWAY INHALATION TREATMENT: CPT

## 2022-05-10 PROCEDURE — 36415 COLL VENOUS BLD VENIPUNCTURE: CPT

## 2022-05-10 RX ORDER — HEPARIN SODIUM 1000 [USP'U]/ML
4400 INJECTION, SOLUTION INTRAVENOUS; SUBCUTANEOUS ONCE
Status: COMPLETED | OUTPATIENT
Start: 2022-05-10 | End: 2022-05-10

## 2022-05-10 RX ADMIN — Medication 2 PUFF: at 08:21

## 2022-05-10 RX ADMIN — GABAPENTIN 100 MG: 100 CAPSULE ORAL at 20:24

## 2022-05-10 RX ADMIN — GABAPENTIN 100 MG: 100 CAPSULE ORAL at 15:36

## 2022-05-10 RX ADMIN — VALSARTAN 80 MG: 80 TABLET, FILM COATED ORAL at 09:35

## 2022-05-10 RX ADMIN — Medication 10 ML: at 09:37

## 2022-05-10 RX ADMIN — Medication 2 PUFF: at 20:27

## 2022-05-10 RX ADMIN — GABAPENTIN 100 MG: 100 CAPSULE ORAL at 09:35

## 2022-05-10 RX ADMIN — HEPARIN SODIUM 1960 UNITS/HR: 10000 INJECTION, SOLUTION INTRAVENOUS; SUBCUTANEOUS at 09:41

## 2022-05-10 RX ADMIN — HEPARIN SODIUM 4400 UNITS: 1000 INJECTION INTRAVENOUS; SUBCUTANEOUS at 11:17

## 2022-05-10 RX ADMIN — CETIRIZINE HYDROCHLORIDE 10 MG: 10 TABLET ORAL at 09:35

## 2022-05-10 RX ADMIN — FLUTICASONE PROPIONATE 2 SPRAY: 50 SPRAY, METERED NASAL at 15:34

## 2022-05-10 RX ADMIN — HEPARIN SODIUM 2180 UNITS/HR: 10000 INJECTION, SOLUTION INTRAVENOUS; SUBCUTANEOUS at 22:05

## 2022-05-10 ASSESSMENT — ENCOUNTER SYMPTOMS
RESPIRATORY NEGATIVE: 1
GASTROINTESTINAL NEGATIVE: 1

## 2022-05-10 NOTE — PROGRESS NOTES
5/10  Anti-Xa = 0.33 at 0315. Continue heparin gtt at 1960 units/hr. Recheck Anti-Xa in 6 hours.   Donna Suarez PharmD  5/10/2022 5:37 AM

## 2022-05-10 NOTE — PROGRESS NOTES
Consult has been called to Dr. Daryn Suárez on 5/10/22. Spoke with Bucky.  9:28 AM    Vivek Hodge  5/10/2022

## 2022-05-10 NOTE — FLOWSHEET NOTE
Shift assessment complete. See flowsheet for full assessment. Pt denies any needs, call light within reach.        05/09/22 1947   Vital Signs   Temp 97.9 °F (36.6 °C)   Temp Source Oral   Pulse 85   Heart Rate Source Monitor   Resp 18   /66   BP Location Left lower arm   MAP (Calculated) 84.33   Patient Position Sitting   Level of Consciousness Alert (0)   MEWS Score 1   Oxygen Therapy   SpO2 94 %   O2 Device None (Room air)

## 2022-05-10 NOTE — FLOWSHEET NOTE
Pt appears to be resting comfortably. VSS. Per pt, no new needs at this time.         05/10/22 0047   Vital Signs   Temp 98.6 °F (37 °C)   Temp Source Oral   Pulse 75   Heart Rate Source Monitor   Resp 18   BP (!) 101/59   BP Location Left lower arm   MAP (Calculated) 73   Patient Position Semi fowlers   Level of Consciousness Alert (0)   MEWS Score 1   Pain Assessment   Pain Assessment None - Denies Pain   Oxygen Therapy   SpO2 93 %   O2 Device None (Room air)   Height and Weight   Weight (!) 355 lb 8 oz (161.3 kg)   BMI (Calculated) 51.1

## 2022-05-10 NOTE — CARE COORDINATION
CM is not following pt. PT has a PE/DVT. After the 30 days free, pt's out of poket expense will be $10/ month with the copay assistance card, as pt has regular [employer]. CM provided the Copay assistance card to pt. Anamika Brannon RN is aware with verbalized understanding. ]      CM is not following pt. If CM is needed, please contact CM.

## 2022-05-10 NOTE — PROGRESS NOTES
Pt aware of being NPO. Pt verbalized understanding. All food and drink removed from bedside. PCT Annette Estrada also notified.

## 2022-05-10 NOTE — PROGRESS NOTES
Pharmacy to Manage Heparin Infusion per [facility]    Dx:  PE  Pt wt = 163.3 (will use adjusted wt of 109.1 kg - actual body weight > 120% ideal body weight). Baseline aPTT = 34.9    Oral factor Xa-inhibitors may alter and elevate anti-Xa levels used for unfractionated heparin monitoring. As a result, anti-Xa monitoring is not accurate while Xa-inhibitor activity is detectable. Utilize aPTT monitoring when patient received an oral factor Xa-inhibitor (apixaban, betrixaban, edoxaban or rivaroxaban) within 72 hours prior to admission (please document last administration time). The goal is to allow a washout of oral factor Xa-inhibitors by using aPTT for 72 hours, then change to ant-Xa levels for UFH. High Dose Heparin Infusion  Heparin 80 units/kg IVP bolus followed by Heparin infusion at 18 units/kg/hr (recommended initial max dose 2100 units/hr). Recheck aPTT in 6 hours. Goal anti-Xa 0.3-0.7 IU/mL  Goal aPTT = 60-90 seconds. Heparin bolus = 8700 units;  initial Heparin infusion at 1960 units/hr. Will monitor with Anti-Xa.     Farhana Adames R.Ph.5/9/20229:40 PM

## 2022-05-10 NOTE — PROGRESS NOTES
Pharmacy - Heparin  Anti-Xa level drawn at 0843 today = 0.25 IU/ml (Goal anti-Xa 0.3-0.7 IU/mL). Current heparin drip rate = 1960 units/hr. Per protocol, give heparin 4400 units IV bolus x 1 and increase heparin drip rate to 2180 units/hr. Draw Anti-Xa level 6 hours after bolus dose given and drip rate increased. Will adjust to goal anti-Xa 0.3-0.7 IU/mL. Pharmacy will continue to monitor and adjust as necessary.

## 2022-05-10 NOTE — PROGRESS NOTES
High dose Heparin GTT:  Anti-Xa at 1730 check is 0.3IU/mL. No changes. Therapeutic range is 0.3-0.7IU/mL. Recheck labs at 0480 66 01 75.   ESMER Keller ARUN [facility] PharmD 5/10/2022 6:32 PM

## 2022-05-10 NOTE — PROGRESS NOTES
Shift assessment complete- see flow sheet. Patient is A/Ox4. VSS. Morning medications given without difficulty. Currently on room air, SpO2 WNL. Lung sounds diminished. Denies shortness of breath today. No CP. Remains on Hep gtt at 19.6 ml/hr. No s/s of bleeding. Patient denies any further needs. Call light explained and in reach. Will continue to monitor.

## 2022-05-10 NOTE — PROGRESS NOTES
Progress Note    Admit Date:  5/6/2022    Subjective:  Mr. Amalia Alvarado was admitted for dyspnea. His stress test is positive but CTA cardiac  with bilateral PE  Started on heparin gtt and off dual antiplatelet tx     Pt seen up in chair today . Feels great , no sob on ambulation in room  No hemoptysis  . No chest pain at rest.   On RA     Objective:   BP (!) 101/59   Pulse 75   Temp 98.6 °F (37 °C) (Oral)   Resp 18   Ht 5' 10\" (1.778 m)   Wt (!) 355 lb 8 oz (161.3 kg)   SpO2 93%   BMI 51.01 kg/m²        No intake or output data in the 24 hours ending 05/10/22 0745    Physical Exam:        General: obese middle aged male   Awake, alert and oriented. Appears to be not in any distress  Mucous Membranes:  Pink , anicteric  Neck: No JVD, no carotid bruit, no thyromegaly  Chest:  Clear to auscultation bilaterally, no added sounds  Cardiovascular:  RRR S1S2 heard, no murmurs or gallops  Abdomen:  Soft, obese, undistended, non tender, no organomegaly, BS present  Extremities: No edema or cyanosis.  Distal pulses well felt  Neurological : grossly normal        Scheduled Meds:   cetirizine  10 mg Oral Daily    sodium chloride flush  5-40 mL IntraVENous 2 times per day    valsartan  80 mg Oral Daily    pantoprazole  40 mg Oral QAM AC    gabapentin  100 mg Oral TID    budesonide-formoterol  2 puff Inhalation BID    fluticasone  2 spray Each Nostril Daily       Continuous Infusions:   heparin (PORCINE) Infusion 1,960 Units/hr (05/09/22 2047)    sodium chloride         PRN Meds:  perflutren lipid microspheres, metoprolol, heparin (porcine), heparin (porcine), sodium chloride flush, sodium chloride, ondansetron **OR** ondansetron, polyethylene glycol, acetaminophen **OR** acetaminophen      Data:  CBC:   Recent Labs     05/07/22  0819 05/09/22  1831   WBC 9.6 11.7*   HGB 15.3 15.8   HCT 44.7 46.6   MCV 86.7 86.8    212     BMP:   No results for input(s): NA, K, CL, CO2, PHOS, BUN, CREATININE, CA in the last 72 hours.  LIVER PROFILE:   No results for input(s): AST, ALT, LIPASE, BILIDIR, BILITOT, ALKPHOS in the last 72 hours. Invalid input(s): AMYLASE,  ALB  Stress test  Conclusions        Summary    Abnormal myocardial perfusion study.    There is a small-medium sized , mild reversible anteroseptal defect,    consistent with ischemia.    Low normal LV systolic function with calculated LVEF of 53%.    There is mild anteroseptal hypokinesis.        Overall findings represent an intermediate risk scan.        Frequent PVCs in pattern of bigeminy noted during stress. CTA CARDIAC W C STRC MORP W CONTRAST   Final Result   Bilateral pulmonary emboli, involving the distal right, and distal left main   pulmonary arteries extending into the segmental branches. No significant dilatation of the right ventricle or bowing of the   intraventricular septum. This result was discussed with Dr. Dionne Talamantes by Dr. Coral Brantley at 5:35 pm.      Phase misregistration artifact limits the study. This decreases sensitivity   and specificity of the study. Scattered plaque-like luminal irregularities   are seen throughout the coronary arteries. Small amount of noncalcified   plaque is seen in the proximal LAD with close to 50% partial obscuration of   the lumen, which could be exaggerated by artifact. Calcium score is 15. No   significant plaque or flow limiting stenosis noted in the right coronary   artery or circumflex         NM Cardiac Stress Test Nuclear Imaging   Final Result      XR CHEST PORTABLE   Final Result   Clear chest without acute cardiopulmonary process. VL Extremity Venous Bilateral    (Results Pending)     CULTURES  Results for Gaston Qureshi (MRN [id number]) as of 5/6/2022 08:13    Ref.  Range 5/6/2022 02:45   INFLUENZA A Latest Ref Range: NOT DETECTED  NOT DETECTED   INFLUENZA B Latest Ref Range: NOT DETECTED  NOT DETECTED   SARS-CoV-2 RNA, RT PCR Latest Ref Range: NOT DETECTED  NOT DETECTED         EKG:  I have reviewed the EKG with the following interpretation:   5/6/22  Normal sinus rhythm  Normal ECG  When compared with ECG of 06-MAY-2022 02:16, (unconfirmed)  No significant change was found     5/6/22  Normal sinus rhythm  Left axis deviation  Abnormal ECG  When compared with ECG of 03-JUL-2019 17:48,  No significant change was found     RADIOLOGY  XR CHEST PORTABLE   Final Result   Clear chest without acute cardiopulmonary process.            Assessment:  Principal Problem:    Chest pain  Active Problems:    Reactive airway disease without complication    GERD (gastroesophageal reflux disease)    Back pain    HTN (hypertension)    Bilateral pulmonary embolism (HCC)  Resolved Problems:    * No resolved hospital problems. *      Plan:    #Chest pain. Sec to Acute bilateral Pulmonary Embolism   -Patient came to the ER with some chest tightness and shortness of breath. Stress test came back positive showing reversible ischemia.    -Cardiology consulted and was started on ASA< plavix and planned for angiogram , however CTA cardiac with bilateral PE. PE was suspected as a cause for his  chest pain and symptoms, off ASA< PLAVIX, started on heparin gtt   Obtain hypercoagulable profile    Recommend 1 yr [facility] for unprovoked PE  Consult pulmonary   Remains hemodynamically stable  Dyspnea and chest pain resolved      #Hypertension.   - Blood pressure stable. Continue valsartan. #GERD on PPI. #Reactive airway disease. Stable. #On Neurontin for back pain. Lovenox for DVT prophylaxis.         Haseeb Kinney MD 5/10/2022 7:45 AM

## 2022-05-10 NOTE — PROGRESS NOTES
[facility]  Cardiology  Progress Note    Admission date:  2022    Reason for follow up visit: CAD    HPI/CC: Deanne Holcomb is a 50 y.o. male who was admitted 2022 for shortness of breath. Stress test abnormal. Echo showed EF 50-55%. Cardiac CTA showed acute PE, nonobstructive CAD. Rhythm has been sinus. Subjective: Denies chest pain, shortness of breath, palpitations and dizziness. Does have chronic left chest/arm tingling. Vitals:  Blood pressure 126/83, pulse 74, temperature 98.2 °F (36.8 °C), temperature source Oral, resp. rate 20, height 5' 10\" (1.778 m), weight (!) 355 lb 8 oz (161.3 kg), SpO2 94 %.   Temp  Av.2 °F (36.8 °C)  Min: 97.9 °F (36.6 °C)  Max: 98.6 °F (37 °C)  Pulse  Av.9  Min: 66  Max: 85  BP  Min: 101/59  Max: 126/83  SpO2  Av.5 %  Min: 93 %  Max: 98 %    24 hour I/O    Intake/Output Summary (Last 24 hours) at 5/10/2022 1503  Last data filed at 5/10/2022 1247  Gross per 24 hour   Intake 480 ml   Output --   Net 480 ml     Current Facility-Administered Medications   Medication Dose Route Frequency Provider Last Rate Last Admin    perflutren lipid microspheres (DEFINITY) injection 1.65 mg  1.5 mL IntraVENous ONCE PRN Henry Cagle MD        metoprolol (LOPRESSOR) injection 2.5 mg  2.5 mg IntraVENous Q5 Min PRN Henry Cagle MD   2.5 mg at 22 1648    heparin (porcine) injection 8,700 Units  8,700 Units IntraVENous PRN Henry Cagle MD        heparin (porcine) injection 4,400 Units  4,400 Units IntraVENous PRN Henry Cagle MD        heparin 25,000 units in dextrose 5% 250 mL (premix) infusion  2,180 Units/hr IntraVENous Continuous Henry Cagle MD 21.8 mL/hr at 05/10/22 1122 2,180 Units/hr at 05/10/22 1122    cetirizine (ZYRTEC) tablet 10 mg  10 mg Oral Daily Mary Betancourt MD   10 mg at 05/10/22 0935    sodium chloride flush 0.9 % injection 5-40 mL  5-40 mL IntraVENous 2 times per day Mary Betancourt MD   10 mL at 05/10/22 0937    sodium chloride flush 0.9 % injection 5-40 mL  5-40 mL IntraVENous PRN Angela Bacon MD        0.9 % sodium chloride infusion   IntraVENous PRN Angela Bacon MD        ondansetron (ZOFRAN-ODT) disintegrating tablet 4 mg  4 mg Oral Q8H PRN Angela Bacon MD        Or    ondansetron [facility]) injection 4 mg  4 mg IntraVENous Q6H PRN Angela Bacon MD        polyethylene glycol [facility]) packet 17 g  17 g Oral Daily PRN Angela Bacon MD        acetaminophen (TYLENOL) tablet 650 mg  650 mg Oral Q6H PRN Angela Bacon MD        Or    acetaminophen (TYLENOL) suppository 650 mg  650 mg Rectal Q6H PRN Angela Bacon MD        valsartan (DIOVAN) tablet 80 mg  80 mg Oral Daily [place], [place]   80 mg at 05/10/22 0935    pantoprazole (PROTONIX) tablet 40 mg  40 mg Oral QAM [facility]   40 mg at 05/09/22 0656    gabapentin (NEURONTIN) capsule 100 mg  100 mg Oral TID [place], [place]   100 mg at 05/10/22 0935    budesonide-formoterol (SYMBICORT) 80-4.5 MCG/ACT inhaler 2 puff  2 puff Inhalation BID [place]   2 puff at 05/10/22 0821    fluticasone (FLONASE) 50 MCG/ACT nasal spray 2 spray  2 spray Each Nostril Daily [place], [place]   2 spray at 05/08/22 1038     Review of Systems   Constitutional: Negative. Respiratory: Negative. Cardiovascular: Negative. Gastrointestinal: Negative. Neurological: Negative.       Objective:     Telemetry monitor: SR    Physical Exam:  Constitutional:  Comfortable and alert, NAD, appears stated age, obese  Eyes: PERRL, sclera nonicteric  Neck:  Supple, no masses, no thyroidmegaly, no JVD  Skin:  Warm and dry; no rash or lesions  Heart: Regular, normal apex, S1 and S2 normal, no M/G/R  Lungs:  Normal respiratory effort; clear; no wheezing/rhonchi/rales  Abdomen: soft, non tender, + bowel sounds  Extremities:  No edema or cyanosis; no clubbing  Neuro: alert and oriented, moves legs and arms equally, normal mood and affect    Data Reviewed:    Cardiac CTA 5/9/2022:  Origin of the right coronary artery is normal.  Right coronary artery gives   rise to patent PDA branch.  No significant plaque or flow limiting stenosis   noted.       Origin of the left coronary artery is normal       Left main coronary artery is small but patent vessel.       Left anterior descending coronary artery gives rise to small but patent   diagonal branches.  Left anterior descending coronary artery is visualized to   the cardiac apex. Raiza Shorts amount of noncalcified plaque is seen in the   proximal LAD with close to 50% partial obscuration of the lumen       Circumflex coronary artery gives rise to patent obtuse marginal branch.  No   significant plaque or flow limiting stenosis noted.       Calcium score is 15.  This is in the 60th percentile.  Score of the LAD is 5. Score of the right coronary artery is 10       No pericardial effusion is seen.  No pericardial calcification       Mediastinum:       Pulmonary emboli are seen peripherally in the right main pulmonary artery,   extending into the right middle lobe, right upper lobe and right lower lobe   pulmonary arteries.  These are incompletely visualized.       On the left, pulmonary embolus is seen in the distal left main pulmonary   artery extending into the left lower lobe and left upper lobe pulmonary   artery.       No significant dilatation of the right ventricle or bowing of the   intraventricular septum.  No intimal flap in aorta.  Ascending aorta is   normal in caliber, measuring 3.3 cm       Lungs:       Respiratory motion artifact limits evaluation fine pulmonary parenchymal   change.  No focal consolidation is seen in the lungs. Rubbie Cellar obvious pulmonary   infarct seen on limited images of the lungs.       Tiny fat containing diaphragmatic hernia seen on the left       Spurring is seen in the spine.       Upper abdomen:       Diffuse low attenuation is seen throughout the liver, compatible with fatty   infiltration.       Mild spurring is seen in the spine. Stress test 5/7/2022:   Abnormal myocardial perfusion study.   Tristinkinjal Rudd is a small-medium sized , mild reversible anteroseptal defect,    consistent with ischemia.    Low normal LV systolic function with calculated LVEF of 53%.    There is mild anteroseptal hypokinesis.        Overall findings represent an intermediate risk scan.        Frequent PVCs in pattern of bigeminy noted during stress. Echo 5/9/2022:  Left ventricular cavity size is normal with mild concentric left ventricular   hypertrophy. Left ventricular systolic function is normal with ejection fraction   estimated at 50-55%. Regional wall motion assessment is limited by poor visibility/off-axis   imaging - within the limits of the study, no clear wall motion   abnormalities. Normal left ventricular diastolic filling pressure. The right ventricle is normal in size and function. The right atrium is mildly dilated. Mild tricuspid regurgitation. Systolic pulmonary artery pressure (SPAP) is mildly elevated, estimated at   42 mmHg (Right atrial pressure of 8 mmHg). Findings consistent with mild   pulmonary hypertension.     Lab Reviewed:     Renal Profile:  Lab Results   Component Value Date    CREATININE 0.8 05/07/2022    BUN 14 05/07/2022     05/07/2022    K 4.6 05/07/2022     05/07/2022    CO2 26 05/07/2022     CBC:    Lab Results   Component Value Date    WBC 11.7 05/09/2022    RBC 5.37 05/09/2022    HGB 15.8 05/09/2022    HCT 46.6 05/09/2022    MCV 86.8 05/09/2022    RDW 13.2 05/09/2022     05/09/2022     BNP:    Lab Results   Component Value Date    PROBNP 16 05/09/2022     Fasting Lipid Panel:  No results found for: CHOL, HDL, TRIG  Cardiac Enzymes:  CK/MbTroponin  Lab Results   Component Value Date    TROPONINI <0.01 05/06/2022     PT/ INR   Lab Results   Component Value Date    INR 1.10 05/09/2022    PROTIME 12.5 05/09/2022     PTT No results found for: PTT No results found for: MG No results found for: TSH    All labs and imaging reviewed today    Assessment:  Shortness of breath: due to PE  CAD: nonobstructive on cardiac CTA 5/9/2022; abnormal stress test 5/7/2022  PVCs  HTN: stable  Pulmonary HTN: mild  iRBBB  Acute PE  Acute DVTs  Obesity    Plan:   1. Reviewed cardiac testing in detail with patient, all questions answered  2. Continue aspirin, statin, valsartan  3. On IV heparin for PE/DVT  4. No evidence of RV strain on echo, BNP 16  5. Lipids as outpatient  6. No further cardiac recommendations, will sign off. Follow up will be arranged.      Uri Morrell, APRN-CNP  [facility]  [phone]

## 2022-05-10 NOTE — CONSULTS
Patient is being seen at the request of Connie Greenwood MD  for a consultation for bilateral PE    HISTORY OF PRESENT ILLNESS:   50years old presented 5/6/2022 with sudden onset SOB started Thursday am. Progressed and severe. Worse with exertion and better with resting. Associated with chest tightness. Not able to take deep breath. CT with IV contrast 5/9 imaging reviewed by me and showed bilateral pulmonary embolism. Brother with DVT. No personal history of DVT/PE. No recent travel. No cancer history. No hormonal meds- was on testosterone replacement 2009 not recently. No recent hospitalization. PAST MEDICAL HISTORY:  Past Medical History:   Diagnosis Date    Arthritis     Asthma     GERD (gastroesophageal reflux disease)     Hypertension      PAST SURGICAL HISTORY:  Past Surgical History:   Procedure Laterality Date    ANKLE SURGERY Left     CARPAL TUNNEL RELEASE Right     KNEE ARTHROSCOPY Right     TONSILLECTOMY AND ADENOIDECTOMY      TYMPANOSTOMY TUBE PLACEMENT         FAMILY HISTORY:  Brother with DVT       SOCIAL HISTORY:   reports that he has never smoked. He has never used smokeless tobacco.    Scheduled Meds:   cetirizine  10 mg Oral Daily    sodium chloride flush  5-40 mL IntraVENous 2 times per day    valsartan  80 mg Oral Daily    pantoprazole  40 mg Oral QAM AC    gabapentin  100 mg Oral TID    budesonide-formoterol  2 puff Inhalation BID    fluticasone  2 spray Each Nostril Daily     Continuous Infusions:   heparin (PORCINE) Infusion 1,960 Units/hr (05/09/22 2047)    sodium chloride       PRN Meds:  perflutren lipid microspheres, metoprolol, heparin (porcine), heparin (porcine), sodium chloride flush, sodium chloride, ondansetron **OR** ondansetron, polyethylene glycol, acetaminophen **OR** acetaminophen    ALLERGIES:  Patient is allergic to ampicillin, bee venom, and testosterone.     REVIEW OF SYSTEMS:  Constitutional: Negative for fever  HENT: Negative for sore throat  Eyes: Negative for redness   Respiratory: + SOB   Cardiovascular: + chest pain  Gastrointestinal: Negative for vomiting, diarrhea   Genitourinary: Negative for hematuria   Musculoskeletal: Negative for arthralgias   Skin: Negative for rash  Neurological: Negative for syncope  Hematological: Negative for adenopathy  Psychiatric/Behavorial: Negative for anxiety    PHYSICAL EXAM:  Blood pressure 126/83, pulse 74, temperature 98.2 °F (36.8 °C), temperature source Oral, resp. rate 20, height 5' 10\" (1.778 m), weight (!) 355 lb 8 oz (161.3 kg), SpO2 94 %.' on RA  Gen: No distress. Eyes: PERRL. No sclera icterus. No conjunctival injection. ENT: No discharge. Pharynx clear. Neck: Trachea midline. No obvious mass. Resp: No accessory muscle use. No crackles. No wheezes. No rhonchi. No dullness on percussion. CV: Regular rate. Regular rhythm. No murmur or rub. No edema. GI: Non-tender. Non-distended. No hernia. Skin: Warm and dry. No nodule on exposed extremities. Lymph: No cervical LAD. No supraclavicular LAD. M/S: No cyanosis. No joint deformity. No clubbing. Neuro: Awake. Alert. Moves all four extremities. Psych: Oriented x 3. No anxiety. LABS:  CBC:   Recent Labs     05/09/22 1831   WBC 11.7*   HGB 15.8   HCT 46.6   MCV 86.8        BMP: No results for input(s): NA, K, CL, CO2, PHOS, BUN, CREATININE, CA in the last 72 hours. LIVER PROFILE: No results for input(s): AST, ALT, LIPASE, BILIDIR, BILITOT, ALKPHOS in the last 72 hours. Invalid input(s): AMYLASE,  ALB  PT/INR:   Recent Labs     05/09/22 1831   PROTIME 12.5   INR 1.10     APTT:   Recent Labs     05/09/22 1831   APTT 34.9     UA:No results for input(s): NITRITE, COLORU, PHUR, LABCAST, WBCUA, RBCUA, MUCUS, TRICHOMONAS, YEAST, BACTERIA, CLARITYU, SPECGRAV, LEUKOCYTESUR, UROBILINOGEN, BILIRUBINUR, BLOODU, GLUCOSEU, AMORPHOUS in the last 72 hours.     Invalid input(s): Jayshree Rendon  No results for input(s): PHART, CMB1OPT, PO2ART in the last 72 hours. CTA cardiac 5/9 imaging was reviewed by me and showed   Bilateral pulmonary emboli, involving the distal right, and distal left main   pulmonary arteries extending into the segmental branches. No significant dilatation of the right ventricle or bowing of the   intraventricular septum.        ASSESSMENT:  · Acute bilateral PE-seems to be unprovoked. Rule out hypercoagulability state  · Shortness of breath   · Chest pain  · Brother with history of DVT    PLAN:  Heparin drip- Consider outpatient novel oral anticoagulants, factor Xa inhibitors or direct thrombin inhibitors. Patient is hemodynamically stable and therefore no indication systemic thrombolysis or catheter-based therapies at this time. Agree with hypercoagulability work-up  I recommend 12 months of anticoagulation and then reassessment for unprovoked PE. Indefinite anticoagulation is recommended for patients who do not have risk factors for bleeding and are willing to continue anticoagulant monitoring.    D/W patient risks and benefits of life long therapy   Cancer screening for age including colonoscopy

## 2022-05-11 VITALS
SYSTOLIC BLOOD PRESSURE: 118 MMHG | HEART RATE: 87 BPM | TEMPERATURE: 99 F | RESPIRATION RATE: 16 BRPM | OXYGEN SATURATION: 95 % | DIASTOLIC BLOOD PRESSURE: 72 MMHG | HEIGHT: 70 IN | BODY MASS INDEX: 45.1 KG/M2 | WEIGHT: 315 LBS

## 2022-05-11 LAB
ANTI-XA UNFRAC HEPARIN: 0.47 IU/ML (ref 0.3–0.7)
HCT VFR BLD CALC: 41.9 % (ref 40.5–52.5)
HEMOGLOBIN: 14.2 G/DL (ref 13.5–17.5)
MCH RBC QN AUTO: 29.5 PG (ref 26–34)
MCHC RBC AUTO-ENTMCNC: 34 G/DL (ref 31–36)
MCV RBC AUTO: 86.9 FL (ref 80–100)
PDW BLD-RTO: 13.2 % (ref 12.4–15.4)
PLATELET # BLD: 199 K/UL (ref 135–450)
PMV BLD AUTO: 6.7 FL (ref 5–10.5)
RBC # BLD: 4.82 M/UL (ref 4.2–5.9)
WBC # BLD: 9 K/UL (ref 4–11)

## 2022-05-11 PROCEDURE — 36415 COLL VENOUS BLD VENIPUNCTURE: CPT

## 2022-05-11 PROCEDURE — 2580000003 HC RX 258: Performed by: HOSPITALIST

## 2022-05-11 PROCEDURE — 6370000000 HC RX 637 (ALT 250 FOR IP)

## 2022-05-11 PROCEDURE — 85027 COMPLETE CBC AUTOMATED: CPT

## 2022-05-11 PROCEDURE — 6370000000 HC RX 637 (ALT 250 FOR IP): Performed by: HOSPITALIST

## 2022-05-11 PROCEDURE — 85520 HEPARIN ASSAY: CPT

## 2022-05-11 PROCEDURE — 99233 SBSQ HOSP IP/OBS HIGH 50: CPT | Performed by: INTERNAL MEDICINE

## 2022-05-11 PROCEDURE — 94761 N-INVAS EAR/PLS OXIMETRY MLT: CPT

## 2022-05-11 PROCEDURE — 94640 AIRWAY INHALATION TREATMENT: CPT

## 2022-05-11 PROCEDURE — 6370000000 HC RX 637 (ALT 250 FOR IP): Performed by: INTERNAL MEDICINE

## 2022-05-11 PROCEDURE — 99238 HOSP IP/OBS DSCHRG MGMT 30/<: CPT | Performed by: INTERNAL MEDICINE

## 2022-05-11 PROCEDURE — 6360000002 HC RX W HCPCS: Performed by: INTERNAL MEDICINE

## 2022-05-11 RX ADMIN — HEPARIN SODIUM 4400 UNITS: 1000 INJECTION INTRAVENOUS; SUBCUTANEOUS at 02:25

## 2022-05-11 RX ADMIN — CETIRIZINE HYDROCHLORIDE 10 MG: 10 TABLET ORAL at 08:53

## 2022-05-11 RX ADMIN — VALSARTAN 80 MG: 80 TABLET, FILM COATED ORAL at 08:53

## 2022-05-11 RX ADMIN — Medication 2 PUFF: at 08:03

## 2022-05-11 RX ADMIN — GABAPENTIN 100 MG: 100 CAPSULE ORAL at 08:53

## 2022-05-11 RX ADMIN — Medication 10 ML: at 08:54

## 2022-05-11 RX ADMIN — FLUTICASONE PROPIONATE 2 SPRAY: 50 SPRAY, METERED NASAL at 08:58

## 2022-05-11 RX ADMIN — APIXABAN 10 MG: 5 TABLET, FILM COATED ORAL at 08:53

## 2022-05-11 RX ADMIN — PANTOPRAZOLE SODIUM 40 MG: 40 TABLET, DELAYED RELEASE ORAL at 06:32

## 2022-05-11 NOTE — PROGRESS NOTES
Discharge instructions reviewed with patient. Meds to beds delivered medications. Patient states understanding.

## 2022-05-11 NOTE — FLOWSHEET NOTE
05/11/22 0100   Vital Signs   Temp 97 °F (36.1 °C)   Temp Source Oral   Pulse 80   Heart Rate Source Monitor   Resp 18   /65   BP Location Left lower arm   MAP (Calculated) 81.67   Patient Position Lying left side   Level of Consciousness Alert (0)   MEWS Score 1   Pain Assessment   Pain Assessment None - Denies Pain   Oxygen Therapy   SpO2 91 %   O2 Device None (Room air)   Height and Weight   Height 5' 10\" (1.778 m)   Weight (!) 356 lb 9.6 oz (161.8 kg)   Weight Method Standing scale   BSA (Calculated - sq m) 2.83 sq meters   BMI (Calculated) 51.3     Pt resting in bed awake. VSS. No s/s of distress. Pt denies SOB or pain. Drink and snack refused. Bed locked in lowest position. Call light and bedside table within reach. Will continue to monitor.

## 2022-05-11 NOTE — PROGRESS NOTES
5/11  Anti-Xa = 0.15 at 2335. Give heparin bolus of 4400 units and increase heparin gtt to 2400 units/hr. Recheck Anti-Xa in 6 hours.   Lorinda Kanner, PharmD  5/11/2022 12:50 AM

## 2022-05-11 NOTE — PROGRESS NOTES
Pulmonary Progress Note    CC: Bilateral PE    Subjective:   Room air  No chest pain  Able to ambulate freely with no distress          Intake/Output Summary (Last 24 hours) at 5/11/2022 0752  Last data filed at 5/11/2022 0657  Gross per 24 hour   Intake 1734.67 ml   Output --   Net 1734.67 ml       Exam:   /65   Pulse 80   Temp 97 °F (36.1 °C) (Oral)   Resp 18   Ht 5' 10\" (1.778 m)   Wt (!) 356 lb 9.6 oz (161.8 kg)   SpO2 91%   BMI 51.17 kg/m²  on room air  Gen: No distress. Eyes: PERRL. No sclera icterus. No conjunctival injection. ENT: No discharge. Pharynx clear. Neck: Trachea midline. No obvious mass. Resp: No accessory muscle use. No crackles. No wheezes. No rhonchi. No dullness on percussion. CV: Regular rate. Regular rhythm. No murmur or rub. No edema. GI: Non-tender. Non-distended. No hernia. Skin: Warm and dry. No nodule on exposed extremities. Lymph: No cervical LAD. No supraclavicular LAD. M/S: No cyanosis. No joint deformity. No clubbing. Neuro: Awake. Alert. Moves all four extremities. Psych: Oriented x 3. No anxiety    Scheduled Meds:   cetirizine  10 mg Oral Daily    sodium chloride flush  5-40 mL IntraVENous 2 times per day    valsartan  80 mg Oral Daily    pantoprazole  40 mg Oral QAM AC    gabapentin  100 mg Oral TID    budesonide-formoterol  2 puff Inhalation BID    fluticasone  2 spray Each Nostril Daily     Continuous Infusions:   heparin (PORCINE) Infusion 2,400 Units/hr (05/11/22 0514)    sodium chloride       PRN Meds:  perflutren lipid microspheres, metoprolol, heparin (porcine), heparin (porcine), sodium chloride flush, sodium chloride, ondansetron **OR** ondansetron, polyethylene glycol, acetaminophen **OR** acetaminophen    Labs:  CBC:   Recent Labs     05/09/22  1831   WBC 11.7*   HGB 15.8   HCT 46.6   MCV 86.8        BMP: No results for input(s): NA, K, CL, CO2, PHOS, BUN, CREATININE, CA in the last 72 hours.   LIVER PROFILE: No results for input(s): AST, ALT, LIPASE, BILIDIR, BILITOT, ALKPHOS in the last 72 hours. Invalid input(s): AMYLASE,  ALB  PT/INR:   Recent Labs     05/09/22 1831   PROTIME 12.5   INR 1.10     APTT:   Recent Labs     05/09/22 1831   APTT 34.9     UA:No results for input(s): NITRITE, COLORU, PHUR, LABCAST, WBCUA, RBCUA, MUCUS, TRICHOMONAS, YEAST, BACTERIA, CLARITYU, SPECGRAV, LEUKOCYTESUR, UROBILINOGEN, BILIRUBINUR, BLOODU, GLUCOSEU, AMORPHOUS in the last 72 hours. Invalid input(s): KETONESU  No results for input(s): PHART, [id number], PO2ART in the last 72 hours. Cultures:   5/6 COVID-19 not detected    Films:  CTA cardiac 5/9 imaging was reviewed by me and showed   Bilateral pulmonary emboli, involving the distal right, and distal left main   pulmonary arteries extending into the segmental branches. No significant dilatation of the right ventricle or bowing of the   intraventricular septum.       LE Doppler 5/10   Acute deep venous thrombosis involving the right popliteal, posterior tibial    and peroneal veins.    Acute deep venous thrombosis involving the left posterior tibial and    peroneal veins     ASSESSMENT:  · Acute bilateral PE-seems to be unprovoked. Rule out hypercoagulability state  · Shortness of breath   · Chest pain-improved  · Bilateral DVT  · Brother with history of DVT     PLAN:  · Heparin drip- plan to discharge on Eliquis  · Patient is hemodynamically stable and therefore no indication systemic thrombolysis or catheter-based therapies at this time. · Agree with hypercoagulability work-up  · I recommend 12 months of anticoagulation and then reassessment for unprovoked PE. Indefinite anticoagulation is recommended for patients who do not have risk factors for bleeding and are willing to continue anticoagulant monitoring.    · Discussed with patient patient risks and benefits of life long therapy   · Cancer screening for age including colonoscopy  · Follow-up as an outpatient and will likely need sleep apnea evaluation  · Discussed with internal medicine

## 2022-05-11 NOTE — PROGRESS NOTES
Progress Note    Admit Date:  5/6/2022    Subjective:  Mr. Michoacano Guaman was admitted for dyspnea. His stress test is positive but CTA cardiac  with bilateral PE  Started on heparin gtt and off dual antiplatelet tx     Pt seen up in chair today . Feels great , ambulating well    no sob on ambulation   No chest pain     No hemoptysis  . Remains on RA    Venous doppler positive for kee DVT    Objective:   /65   Pulse 80   Temp 97 °F (36.1 °C) (Oral)   Resp 18   Ht 5' 10\" (1.778 m)   Wt (!) 356 lb 9.6 oz (161.8 kg)   SpO2 91%   BMI 51.17 kg/m²          Intake/Output Summary (Last 24 hours) at 5/11/2022 0755  Last data filed at 5/11/2022 0657  Gross per 24 hour   Intake 1734.67 ml   Output --   Net 1734.67 ml       Physical Exam:        General: obese middle aged male   Awake, alert and oriented. Appears to be not in any distress  Mucous Membranes:  Pink , anicteric  Neck: No JVD, no carotid bruit, no thyromegaly  Chest:  Clear to auscultation bilaterally, no added sounds  Cardiovascular:  RRR S1S2 heard, no murmurs or gallops  Abdomen:  Soft, obese, undistended, non tender, no organomegaly, BS present  Extremities: varicose veins on both legs noted  No edema or cyanosis.  Distal pulses well felt  Neurological : grossly normal        Scheduled Meds:   cetirizine  10 mg Oral Daily    sodium chloride flush  5-40 mL IntraVENous 2 times per day    valsartan  80 mg Oral Daily    pantoprazole  40 mg Oral QAM AC    gabapentin  100 mg Oral TID    budesonide-formoterol  2 puff Inhalation BID    fluticasone  2 spray Each Nostril Daily       Continuous Infusions:   heparin (PORCINE) Infusion 2,400 Units/hr (05/11/22 0514)    sodium chloride         PRN Meds:  perflutren lipid microspheres, metoprolol, heparin (porcine), heparin (porcine), sodium chloride flush, sodium chloride, ondansetron **OR** ondansetron, polyethylene glycol, acetaminophen **OR** acetaminophen      Data:  CBC:   Recent Labs     05/09/22  1831 05/11/22  0722   WBC 11.7* 9.0   HGB 15.8 14.2   HCT 46.6 41.9   MCV 86.8 86.9    199     BMP:   No results for input(s): NA, K, CL, CO2, PHOS, BUN, CREATININE, CA in the last 72 hours. LIVER PROFILE:   No results for input(s): AST, ALT, LIPASE, BILIDIR, BILITOT, ALKPHOS in the last 72 hours. Invalid input(s): AMYLASE,  ALB  Stress test  Conclusions        Summary    Abnormal myocardial perfusion study.    There is a small-medium sized , mild reversible anteroseptal defect,    consistent with ischemia.    Low normal LV systolic function with calculated LVEF of 53%.    There is mild anteroseptal hypokinesis.        Overall findings represent an intermediate risk scan.        Frequent PVCs in pattern of bigeminy noted during stress. VL Extremity Venous Bilateral   Final Result      CTA CARDIAC W C STRC MORP W CONTRAST   Final Result   Bilateral pulmonary emboli, involving the distal right, and distal left main   pulmonary arteries extending into the segmental branches. No significant dilatation of the right ventricle or bowing of the   intraventricular septum. This result was discussed with Dr. Kerry Ortiz by Dr. Emmanuel Yan at 5:35 pm.      Phase misregistration artifact limits the study. This decreases sensitivity   and specificity of the study. Scattered plaque-like luminal irregularities   are seen throughout the coronary arteries. Small amount of noncalcified   plaque is seen in the proximal LAD with close to 50% partial obscuration of   the lumen, which could be exaggerated by artifact. Calcium score is 15. No   significant plaque or flow limiting stenosis noted in the right coronary   artery or circumflex         NM Cardiac Stress Test Nuclear Imaging   Final Result      XR CHEST PORTABLE   Final Result   Clear chest without acute cardiopulmonary process. CULTURES  Results for Martha Aponte (MRN [id number]) as of 5/6/2022 08:13    Ref.  Range 5/6/2022 02:45   INFLUENZA A Latest Ref Range: NOT DETECTED  NOT DETECTED   INFLUENZA B Latest Ref Range: NOT DETECTED  NOT DETECTED   SARS-CoV-2 RNA, RT PCR Latest Ref Range: NOT DETECTED  NOT DETECTED         EKG:  I have reviewed the EKG with the following interpretation:   5/6/22  Normal sinus rhythm  Normal ECG  When compared with ECG of 06-MAY-2022 02:16, (unconfirmed)  No significant change was found     5/6/22  Normal sinus rhythm  Left axis deviation  Abnormal ECG  When compared with ECG of 03-JUL-2019 17:48,  No significant change was found     RADIOLOGY  XR CHEST PORTABLE   Final Result   Clear chest without acute cardiopulmonary process.            Assessment:  Principal Problem:    Chest pain  Active Problems:    Reactive airway disease without complication    GERD (gastroesophageal reflux disease)    Back pain    HTN (hypertension)    Pulmonary embolus (HCC)  Resolved Problems:    * No resolved hospital problems. *      Plan:    #Chest pain. Sec to Acute bilateral Pulmonary Embolism   -Patient came to the ER with some chest tightness and shortness of breath. Stress test came back positive showing reversible ischemia.    -Cardiology consulted and was started on ASA< plavix and planned for angiogram , however CTA cardiac with bilateral PE. Cardiac portion showed Calcium score of 15 and non obstructive disease     Acute bilateral PE   This was suspected as a cause for his  chest pain and symptoms, off ASA< PLAVIX, started on heparin gtt     Obtained hypercoagulable profile  Venous doppler showed bilateral LE DVT as well   Remains hemodynamically stable and on RA  Dyspnea and chest pain resolved  Transitioned to [facility] today   Recommend 1 yr [facility] for unprovoked PE   Colonoscopy and age appropriate cancer screening recommended  Consulted pulmonary and he will f.w as outpt        #Hypertension.   - Blood pressure stable. Continue valsartan. #GERD on PPI. #Reactive airway disease. Stable. #On Neurontin for back pain.     Lovenox for DVT prophylaxis.     Long discussion with pt regarding eliquis and warned about bleeding issues    F/w PCP in 1-2 weeks    Ryan Haywood MD 5/11/2022 7:55 AM

## 2022-05-11 NOTE — CARE COORDINATION
DC order noted. Chart reviewed. Per CM notes:    PT has a PE/DVT. After the 30 days free, pt's out of poket expense will be $10/ month with the copay assistance card, as pt has regular [employer]. CM provided the Copay assistance card to pt. No further dc needs identified. Discharge timeout done with Rambo Cast.  All discharge needs and concerns addressed.

## 2022-05-11 NOTE — FLOWSHEET NOTE
05/10/22 2014   Vital Signs   Temp 97.8 °F (36.6 °C)   Temp Source Oral   Pulse 94   Heart Rate Source Monitor   Resp 18   /71   BP Location Left lower arm   MAP (Calculated) 86.67   Patient Position Up in chair   Level of Consciousness Alert (0)   MEWS Score 1   Pain Assessment   Pain Assessment None - Denies Pain   Oxygen Therapy   SpO2 95 %   O2 Device None (Room air)     Shift assessment complete, see flowsheets. Medications given, see MAR. Pt A&Ox4. VSS. No complaints of pain or SOB. Pt encouraged to deep breath. Bilateral lung sounds diminished. NSR on the monitor. Chair locked in position. Call light and bedside table within reach. Will continue to monitor.

## 2022-05-11 NOTE — PLAN OF CARE
Problem: Discharge Planning  Goal: Discharge to home or other facility with appropriate resources  5/10/2022 2345 by Cassie Shah RN  Outcome: Progressing  5/10/2022 2345 by Cassie Shah RN  Outcome: Progressing  Flowsheets (Taken 5/10/2022 2016)  Discharge to home or other facility with appropriate resources: Identify barriers to discharge with patient and caregiver     Problem: Pain  Goal: Verbalizes/displays adequate comfort level or baseline comfort level  5/10/2022 2345 by Cassie Shah RN  Outcome: Progressing  5/10/2022 2345 by Cassie Shah RN  Outcome: Progressing     Problem: Skin/Tissue Integrity  Goal: Absence of new skin breakdown  Description: 1. Monitor for areas of redness and/or skin breakdown  2. Assess vascular access sites hourly  3. Every 4-6 hours minimum:  Change oxygen saturation probe site  4. Every 4-6 hours:  If on nasal continuous positive airway pressure, respiratory therapy assess nares and determine need for appliance change or resting period.   5/10/2022 2345 by Cassie Shah RN  Outcome: Progressing  5/10/2022 2345 by Cassie Shah RN  Outcome: Progressing

## 2022-05-15 LAB
ACTIVATED PROTEIN C RESISTANCE: 3.23
ANTICARDIOLIPIN IGG ANTIBODY: <10 GPL
ANTITHROMBIN ACTIVITY: 66 % (ref 76–128)
BETA-2 GLYCOPROTEIN 1 IGG ANTIBODY: <10 SGU
BETA-2 GLYCOPROTEIN 1 IGM ANTIBODY: <10 SMU
CARDIOLIPIN AB IGM: <10 MPL
DRVVT CONFIRMATION TEST: ABNORMAL RATIO
DRVVT SCREEN: 23 SEC (ref 33–44)
DRVVT,DIL: ABNORMAL SEC (ref 33–44)
FACTOR V LEIDEN: ABNORMAL
FACV SPECIMEN: ABNORMAL
HEXAGONAL PHOSPHOLIPID NEUTRALIZAT TEST: ABNORMAL
HOMOCYSTEINE: 11 UMOL/L (ref 0–15)
LUPUS ANTICOAG INTERP: ABNORMAL
PLT NEUTA: ABNORMAL
PROTEIN C FUNCTIONAL: 107 % (ref 83–168)
PROTEIN S ANTIGEN, FREE: 90 % (ref 74–147)
PROTHROMBIN G20210A MUTATION: NEGATIVE
PT D: 13.4 SEC (ref 12–15.5)
PT PCR SPECIMEN: ABNORMAL
PTT D: 43 SEC (ref 32–48)
PTT-D CORR REFLEX: ABNORMAL SEC (ref 32–48)
PTT-HEPARIN NEUTRALIZED: ABNORMAL SEC (ref 32–48)
REPTILASE TIME: ABNORMAL SEC
THROMBIN TIME: ABNORMAL SEC (ref 14.7–19.5)
THROMBOSIS INTERPRETATION: ABNORMAL

## 2022-06-09 ENCOUNTER — OFFICE VISIT (OUTPATIENT)
Dept: CARDIOLOGY CLINIC | Age: 47
End: 2022-06-09
Payer: COMMERCIAL

## 2022-06-09 VITALS
DIASTOLIC BLOOD PRESSURE: 80 MMHG | HEIGHT: 70 IN | HEART RATE: 74 BPM | TEMPERATURE: 98.5 F | BODY MASS INDEX: 45.1 KG/M2 | WEIGHT: 315 LBS | OXYGEN SATURATION: 95 % | SYSTOLIC BLOOD PRESSURE: 126 MMHG

## 2022-06-09 DIAGNOSIS — I25.10 CORONARY ARTERY DISEASE INVOLVING NATIVE CORONARY ARTERY OF NATIVE HEART WITHOUT ANGINA PECTORIS: Primary | ICD-10-CM

## 2022-06-09 DIAGNOSIS — I10 PRIMARY HYPERTENSION: ICD-10-CM

## 2022-06-09 PROCEDURE — 99214 OFFICE O/P EST MOD 30 MIN: CPT | Performed by: NURSE PRACTITIONER

## 2022-06-09 RX ORDER — ATORVASTATIN CALCIUM 10 MG/1
10 TABLET, FILM COATED ORAL DAILY
COMMUNITY
Start: 2022-05-18

## 2022-06-09 ASSESSMENT — ENCOUNTER SYMPTOMS
GASTROINTESTINAL NEGATIVE: 1
RESPIRATORY NEGATIVE: 1

## 2022-06-09 NOTE — PATIENT INSTRUCTIONS
Everything looks great today, good job!   Will consider aspirin after eliquis stops  Continue other medications  Stay active along with a healthy diet  Follow up in 3 months with Dr. Christina Brizuela

## 2022-06-09 NOTE — PROGRESS NOTES
[facility]   Cardiology Note              Date:  June 9, 2022  Patientname: Raysa Haynes  YOB: 1975    Primary Care physician: Miguelangel Hernandez MD    HISTORY OF PRESENT ILLNESS: Raysa Haynes is a 50 y.o. male with a history of CAD, HTN, RBBB, PE/DVTs. He was admitted 5/6/2022 for shortness of breath. Stress test abnormal. Echo showed EF 50-55%. Cardiac CTA showed acute PE, nonobstructive CAD. Today he presents for hospital follow up for CAD. He is feeling better. He has fatigue but this is improved. He denies chest pain, shortness of breath, palpitations and dizziness. 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 Past Medical History:   has a past medical history of Arthritis, Asthma, GERD (gastroesophageal reflux disease), and Hypertension. Past Surgical History:   has a past surgical history that includes Knee arthroscopy (Right); Ankle surgery (Left); Carpal tunnel release (Right); Tympanostomy tube placement; and Tonsillectomy and adenoidectomy. Home Medications:    Prior to Admission medications    Medication Sig Start Date End Date Taking? Authorizing Provider   apixaban starter pack (ELIQUIS) 5 MG TBPK tablet Take 1 tablet by mouth See Admin Instructions 5/11/22   Yanick Estrada MD   fluticasone [facility]) 50 MCG/ACT nasal spray 2 sprays by Each Nostril route daily    Historical Provider, MD   fluticasone-vilanterol (BREO ELLIPTA) 100-25 MCG/INH AEPB inhaler Inhale 1 puff into the lungs daily    Historical Provider, MD   valsartan (DIOVAN) 80 MG tablet Take 80 mg by mouth daily    Historical Provider, MD   omeprazole (PRILOSEC) 20 MG delayed release capsule Take 40 mg by mouth daily    Historical Provider, MD   gabapentin (NEURONTIN) 100 MG capsule Take 100 mg by mouth 3 times daily.  As needed    Historical Provider, MD   Cetirizine HCl (ZYRTEC ALLERGY PO) Take 1 tablet by mouth daily    Historical Provider, MD   Multiple Vitamin (MULTIVITAMIN) tablet Take 1 tablet by mouth daily    Historical Provider, MD   Dextromethorphan-guaiFENesin [facility] DM PO) Take by mouth 2 times daily    Historical Provider, MD   albuterol sulfate HFA (PROAIR HFA) 108 (90 Base) MCG/ACT inhaler Use every 4 hours 2 puffs while awake for 7-10 days then PRN wheezing  Dispense with SPACER and Instruct on use. May sub Ventolin or Proventil as needed per [employer]. Patient not taking: Reported on 5/6/2022 7/3/19   Billie Laird PA-C     Allergies:  Ampicillin, Bee venom, and Testosterone    Social History:   reports that he has never smoked. He has never used smokeless tobacco. He reports previous alcohol use. He reports previous drug use. Family History: family history is not on file. Review of Systems   Review of Systems   Constitutional: Positive for fatigue. Respiratory: Negative. Cardiovascular: Negative. Gastrointestinal: Negative. Neurological: Negative.       OBJECTIVE:    Vital signs:    /80   Pulse 74   Temp 98.5 °F (36.9 °C)   Ht 5' 10\" (1.778 m)   Wt (!) 357 lb (161.9 kg)   SpO2 95%   BMI 51.22 kg/m²      Physical Exam:  Constitutional:  Comfortable and alert, NAD, appears stated age  Eyes: PERRL, sclera nonicteric  Neck:  Supple, no masses, no thyroidmegaly, no JVD  Skin:  Warm and dry; no rash or lesions  Heart: Regular, normal apex, S1 and S2 normal, no M/G/R  Lungs:  Normal respiratory effort; clear; no wheezing/rhonchi/rales  Abdomen: soft, non tender, + bowel sounds  Extremities:  No edema or cyanosis; no clubbing  Neuro: alert and oriented, moves legs and arms equally, normal mood and affect    Data Reviewed:      Cardiac CTA 5/9/2022:  Origin of the right coronary artery is normal.  Right coronary artery gives   rise to patent PDA branch.  No significant plaque or flow limiting stenosis   noted.       Origin of the left coronary artery is normal       Left main coronary artery is small but patent vessel.       Left anterior descending coronary artery gives rise to small but patent   diagonal branches.  Left anterior descending coronary artery is visualized to   the cardiac apex. Valeta Bird amount of noncalcified plaque is seen in the   proximal LAD with close to 50% partial obscuration of the lumen       Circumflex coronary artery gives rise to patent obtuse marginal branch.  No   significant plaque or flow limiting stenosis noted.       Calcium score is 15.  This is in the 60th percentile.  Score of the LAD is 5. Score of the right coronary artery is 10       No pericardial effusion is seen.  No pericardial calcification       Mediastinum:       Pulmonary emboli are seen peripherally in the right main pulmonary artery,   extending into the right middle lobe, right upper lobe and right lower lobe   pulmonary arteries.  These are incompletely visualized.       On the left, pulmonary embolus is seen in the distal left main pulmonary   artery extending into the left lower lobe and left upper lobe pulmonary   artery.       No significant dilatation of the right ventricle or bowing of the   intraventricular septum.  No intimal flap in aorta.  Ascending aorta is   normal in caliber, measuring 3.3 cm       Lungs:       Respiratory motion artifact limits evaluation fine pulmonary parenchymal   change.  No focal consolidation is seen in the lungs. [place] obvious pulmonary   infarct seen on limited images of the lungs.       Tiny fat containing diaphragmatic hernia seen on the left       Spurring is seen in the spine.       Upper abdomen:       Diffuse low attenuation is seen throughout the liver, compatible with fatty   infiltration.       Mild spurring is seen in the spine.      Stress test 5/7/2022:   Abnormal myocardial perfusion study.   [place] is a small-medium sized , mild reversible anteroseptal defect,    consistent with ischemia.    Low normal LV systolic function with calculated LVEF of 53%.    There is mild anteroseptal hypokinesis.        Overall findings represent an intermediate risk scan.        Frequent PVCs in pattern of bigeminy noted during stress.      Echo 5/9/2022:  Left ventricular cavity size is normal with mild concentric left ventricular   hypertrophy.   Left ventricular systolic function is normal with ejection fraction   estimated at 50-55%.   Regional wall motion assessment is limited by poor visibility/off-axis   imaging - within the limits of the study, no clear wall motion   abnormalities.   Normal left ventricular diastolic filling pressure.   The right ventricle is normal in size and function.   The right atrium is mildly dilated.   Mild tricuspid regurgitation.   Systolic pulmonary artery pressure (SPAP) is mildly elevated, estimated at   42 mmHg (Right atrial pressure of 8 mmHg). Findings consistent with mild   pulmonary hypertension. Cardiology Labs Reviewed:   CBC: No results for input(s): WBC, HGB, HCT, PLT in the last 72 hours. BMP:No results for input(s): NA, K, CO2, BUN, CREATININE, LABGLOM, GLUCOSE in the last 72 hours. PT/INR: No results for input(s): PROTIME, INR in the last 72 hours. APTT:No results for input(s): APTT in the last 72 hours. FASTING LIPID PANEL:No results found for: HDL, LDLDIRECT, LDLCALC, TRIG  LIVER PROFILE:No results for input(s): AST, ALT, ALB in the last 72 hours. BNP:   Lab Results   Component Value Date    PROBNP 16 05/09/2022     Reviewed all labs and imaging today    Assessment:   CAD: stable, no angina; nonobstructive on cardiac CTA 5/9/2022; abnormal stress test 5/7/2022  HTN: stable  HLD: stable, LDL 89, statin  Pulmonary HTN: mild  iRBBB  PE/DVTs: noted 5/2022  Obesity: weight loss recommended     Plan:   1. Discussed adding aspirin 81 mg daily for CAD. Will hold for now as he is on eliquis for DVT/PE. Will add once anticoagulation is discontinued. If long term anticoagulation anticipated, will start sooner. He will also discuss with Dr. Eddie Bowie. 2. Continue statin, valsartan  3.  Eliquis for DVT/PE; he has seen hem/onc  4. Stay active along with a healthy diet  5. Check BP at home and call the office if consistently out of goal range  6.  Follow up in 3 months with Dr. Surya Altamirano, CRAIG-CNP  [facility]  [phone]

## 2022-06-16 NOTE — DISCHARGE SUMMARY
Name:  Giovanna [facility]  Room:  /1336-15  MRN:    [id number]    IM Discharge Summary    Discharging Physician:  Aleida Moody MD    Admit: 5/6/2022    Discharge:  5/11/2022    PCP      Cristal Bowden MD    Diagnoses and hospital course  this Admission           #Chest pain. Sec to Acute bilateral Pulmonary Embolism   -Patient came to the ER with some chest tightness and shortness of breath. Stress test came back positive showing reversible ischemia.    -Cardiology consulted and was started on ASA< plavix and planned for angiogram , however CTA cardiac with bilateral PE.      Cardiac portion showed Calcium score of 15 and non obstructive disease      Acute bilateral PE   This was suspected as a cause for his  chest pain and symptoms, off ASA< PLAVIX, started on heparin gtt      Obtained hypercoagulable profile  Venous doppler showed bilateral LE DVT as well   Remains hemodynamically stable and on RA  Dyspnea and chest pain resolved  Transitioned to [facility] today   Recommend 1 yr [facility] for unprovoked PE   Colonoscopy and age appropriate cancer screening recommended  Consulted pulmonary and he will f.w as outpt           #Hypertension.   - Blood pressure stable. Continue valsartan.     #GERD on PPI.     #Reactive airway disease. Stable.     #On Neurontin for back pain.     Lovenox for DVT prophylaxis.     Long discussion with pt regarding eliquis and warned about bleeding issues    HPI   50 y.o. male with HTN who presented to [facility] ED with complaint of chest pain that has been present since yesterday. He states that he just completed an 8 day course of prednisone for back pain. Starting yesterday he had a tingling sensation begin at his left upper chest which radiated down his left arm. He did not describe this as pain. He states that he eventually developed chest \"pressure\" over his breastbone. He states this does not radiate. It has been intermittent since onset.  Associated with SOB that he describes as \"not being able to take a deep breath\". Aggravated by nothing. Alleviated by nothing. He jones not have history of pain like this in the past.  He endorses having a cardiac work up in the past x2, more for surveillance as his father does have history of heart disease and apparently his whole family went to get checked. Denies any URI symptoms, cough, pain with inspiration, recent new exercises or heavy lifting, abd pain, n/v/d. Physical Exam at Discharge:      General: obese middle aged male   Awake, alert and oriented. Appears to be not in any distress  Mucous Membranes:  Pink , anicteric  Neck: No JVD, no carotid bruit, no thyromegaly  Chest:  Clear to auscultation bilaterally, no added sounds  Cardiovascular:  RRR S1S2 heard, no murmurs or gallops  Abdomen:  Soft, obese, undistended, non tender, no organomegaly, BS present  Extremities: varicose veins on both legs noted  No edema or cyanosis. Distal pulses well felt  Neurological : grossly normal    Radiology (Please Review Full Report for Details)       Stress test  Conclusions        Summary    Abnormal myocardial perfusion study.    There is a small-medium sized , mild reversible anteroseptal defect,    consistent with ischemia.    Low normal LV systolic function with calculated LVEF of 53%.    There is mild anteroseptal hypokinesis.        Overall findings represent an intermediate risk scan.        Frequent PVCs in pattern of bigeminy noted during stress.            VL Extremity Venous Bilateral   Final Result       CTA CARDIAC W C STRC MORP W CONTRAST   Final Result   Bilateral pulmonary emboli, involving the distal right, and distal left main   pulmonary arteries extending into the segmental branches.       No significant dilatation of the right ventricle or bowing of the   intraventricular septum. This result was discussed with Dr. Marva Chavez by Dr. Emiliano Garcia at 5:35 pm.       Phase misregistration artifact limits the study.  This decreases sensitivity   and specificity of the study. Scattered plaque-like luminal irregularities   are seen throughout the coronary arteries. Small amount of noncalcified   plaque is seen in the proximal LAD with close to 50% partial obscuration of   the lumen, which could be exaggerated by artifact. Calcium score is 15. No   significant plaque or flow limiting stenosis noted in the right coronary   artery or circumflex         XR CHEST PORTABLE   Final Result   Clear chest without acute cardiopulmonary process.              EKG:  I have reviewed the EKG with the following interpretation:   5/6/22  Normal sinus rhythm  Normal ECG  When compared with ECG of 06-MAY-2022 02:16, (unconfirmed)  No significant change was found     5/6/22  Normal sinus rhythm  Left axis deviation  Abnormal ECG  When compared with ECG of 03-JUL-2019 17:48,  No significant change was found     RADIOLOGY  XR CHEST PORTABLE   Final Result   Clear chest without acute cardiopulmonary process.            Consults:    1. cardiology                No results for input(s): WBC, HGB, HCT, MCV, PLT in the last 72 hours. No results for input(s): NA, K, CL, CO2, PHOS, BUN, CREATININE, CA in the last 72 hours.     CBC:  Lab Results   Component Value Date    WBC 9.0 05/11/2022    HGB 14.2 05/11/2022    HCT 41.9 05/11/2022    MCV 86.9 05/11/2022     05/11/2022    NEUTOPHILPCT 76.0 05/07/2022    LYMPHOPCT 23.0 05/07/2022    MONOPCT 1.0 05/07/2022    BASOPCT 0.0 05/07/2022    NEUTROABS 7.3 05/07/2022    LYMPHSABS 2.2 05/07/2022    MONOSABS 0.1 05/07/2022    EOSABS 0.0 05/07/2022    BASOSABS 0.0 05/07/2022     BNP:   Lab Results   Component Value Date     05/07/2022    K 4.6 05/07/2022    CO2 26 05/07/2022    BUN 14 05/07/2022    CREATININE 0.8 05/07/2022    CALCIUM 9.1 05/07/2022                Medication List      START taking these medications    apixaban starter pack 5 MG Tbpk tablet  Commonly known as: ELIQUIS  Take 1 tablet by mouth See Admin Instructions CONTINUE taking these medications    albuterol sulfate  (90 Base) MCG/ACT inhaler  Commonly known as: ProAir HFA  Use every 4 hours 2 puffs while awake for 7-10 days then PRN wheezing  Dispense with SPACER and Instruct on use. May sub Ventolin or Proventil as needed per [employer]. Diovan 80 MG tablet  Generic drug: valsartan     fluticasone 50 MCG/ACT nasal spray  Commonly known as: FLONASE     fluticasone-vilanterol 100-25 MCG/INH Aepb inhaler  Commonly known as: BREO ELLIPTA     gabapentin 100 MG capsule  Commonly known as: NEURONTIN     MUCINEX DM PO     multivitamin tablet     omeprazole 20 MG delayed release capsule  Commonly known as: PRILOSEC     ZYRTEC ALLERGY PO        STOP taking these medications    aspirin 81 MG EC tablet     doxycycline hyclate 100 MG tablet  Commonly known as: VIBRA-TABS     lisinopril 10 MG tablet  Commonly known as: PRINIVIL;ZESTRIL           Where to Get Your Medications      These medications were sent to [id number] [place], [place] [id number]    Phone: [phone]   · apixaban starter pack 5 MG Tbpk tablet           Discharge Condition/Location: stable/home     Follow Up:    PCP in 1 weeks      Time Spent on discharge is more than 30 minutes in the examination, evaluation, counseling and review of medications and discharge plan.       Haseeb Kinney MD, 6/16/2022 5:36 AM

## 2023-04-20 ENCOUNTER — HOSPITAL ENCOUNTER (OUTPATIENT)
Dept: PHYSICAL THERAPY | Age: 48
Setting detail: THERAPIES SERIES
Discharge: HOME | End: 2023-04-20
Payer: COMMERCIAL

## 2023-04-20 PROCEDURE — 97110 THERAPEUTIC EXERCISES: CPT

## 2023-04-20 PROCEDURE — 97161 PT EVAL LOW COMPLEX 20 MIN: CPT

## 2023-04-20 NOTE — PLAN OF CARE
[facility] Therapy  [address]     [place], [facility]  Phone: [phone]       Fax:   [phone]       Physical Therapy Certification    Dear  Referring Provider (secondary): Serge Sweeney DO    We had the pleasure of evaluating the following patient for physical therapy services at [address] [facility]. A summary of our findings can be found in the initial assessment below. This includes our plan of care. If you have any questions or concerns regarding these findings, please do not hesitate to contact me at the office phone number checked above. Thank you for the referral.       Physician Signature:_______________________________Date:__________________  By signing above (or electronic signature), therapist's plan is approved by physician      Patient: Mitra Mcdermott   : 1975   MRN: [id number]    Referring Provider (secondary): Serge Sweeney DO        Evaluation Date: 2023        Medical Diagnosis Information:  Diagnosis: M75.00 (ICD-10-CM) - Adhesive capsulitis   Treatment Diagnosis: increased R shoulder pain, decreased RUE AROM, difficulty reaching [employer] information: PT Insurance Information: BCBS     Precautions/ Contra-indications: HTN, asthma, OA  Latex Allergy:  [x]NO      []YES     Preferred Language for Healthcare:   [x]English       []other:    C-SSRS Triggered by Intake questionnaire (Past 2 wk assessment):   [x] No, Questionnaire did not trigger screening.   [] Yes, Patient intake triggered further evaluation      [] C-SSRS Screening completed  [] PCP notified via Plan of Care  [] Emergency services notified        SUBJECTIVE FINDINGS      History of Present Illness:     Pt stats that about 3 weeks ago he started with pain in R anterior lateral shoulder. Describes pain as sharp and shooting.  Increased pain with reaching OH, reaching out, reaching

## 2023-04-20 NOTE — FLOWSHEET NOTE
Physical Therapy Daily Treatment Note    [x]Daily Tx Note    []Progress Note    [] Discharge Summary         Date:  2023    Patient Name:  Luiza Lynn    :  1975  MRN: [id number]      Medical/Treatment Diagnosis Information:  Diagnosis: M75.00 (ICD-10-CM) - Adhesive capsulitis  Treatment Diagnosis: increased R shoulder pain, decreased RUE AROM, difficulty reaching OH    Insurance/Certification information:  PT Insurance Information: BCBS    Physician Information:  Referring Provider (secondary): Mohit Dorantes DO      Plan of care sent to provider:      [x]Faxed   []Co-signature    (attempts: 1[x] 23 2 []3[])     Plan of care signed :  []  Yes  [x] No      Date of Patient follow up with Physician: May (1 month)    Is this a Progress Report:     []  Yes  [x]  No      If Yes:  Date Range for reporting period:  Beginning 2023  Ending    Progress report will be due (10 Rx or 30 days whichever is less):     42  Recertification will be due (POC Duration  / 90 days whichever is less): 23      Visit # Insurance Allowable Auth Required     50 PT/OT/ST []  Yes [x]  No        Functional Scale:  SPADI    2023: 81/130 = 62%    Latex Allergy:  [x]NO      []YES  Preferred Language for Healthcare:   [x]English       []other:    RESTRICTIONS/PRECAUTIONS:  HTN, asthma, OA    SUBJECTIVE:  See eval    Pain level:  0/10      Plan Moving Forward/ For next visit:    AROM   OH strengthening      OBJECTIVE: See eval    Exercises/Interventions:     Exercises in bold performed in department today. Items not bolded are carried forward from prior visits for continuity of the record.   Exercise/Equipment Resistance/  level  Sets/sec Reps  HEP Notes     THERAPEUTIC EXERCISE        UBE -fwd/bwd  Pulleys- flex, abd    []    BUE ER    []    Scap squeeze        High row  Lat pull down  Mid row  ER  IR        Chop  Lift         Doorway stretches  -60 deg pec  -90 deg pec  -90 turn away  -120 deg

## 2023-04-26 ENCOUNTER — HOSPITAL ENCOUNTER (OUTPATIENT)
Dept: PHYSICAL THERAPY | Age: 48
Setting detail: THERAPIES SERIES
Discharge: HOME | End: 2023-04-26
Payer: COMMERCIAL

## 2023-04-26 ENCOUNTER — APPOINTMENT (OUTPATIENT)
Dept: PHYSICAL THERAPY | Age: 48
End: 2023-04-26
Payer: COMMERCIAL

## 2023-04-26 NOTE — PROGRESS NOTES
Physical Therapy  Cancellation/No-show Note    Patient Name:  Vandana Beck  :  1975   Date:  2023  Cancels to Date: 1  No-shows to Date: 0    For today's appointment patient:  [x]  Cancelled - 23  []  Rescheduled appointment  []  No-show     Reason given by patient:  []  Patient ill  [x]  Conflicting appointment  []  No transportation    []  Conflict with work  []  No reason given  []  Other:     Comments:      Electronically signed by:  Jimmy Rubio PT, DPT

## 2023-05-01 ENCOUNTER — HOSPITAL ENCOUNTER (OUTPATIENT)
Dept: PHYSICAL THERAPY | Age: 48
Setting detail: THERAPIES SERIES
Discharge: HOME | End: 2023-05-01
Payer: COMMERCIAL

## 2023-05-01 PROCEDURE — 97110 THERAPEUTIC EXERCISES: CPT

## 2023-05-01 PROCEDURE — 97140 MANUAL THERAPY 1/> REGIONS: CPT

## 2023-05-01 PROCEDURE — 97112 NEUROMUSCULAR REEDUCATION: CPT

## 2023-05-01 NOTE — FLOWSHEET NOTE
Physical Therapy Daily Treatment Note    [x]Daily Tx Note    []Progress Note    [] Discharge Summary         Date:  2023    Patient Name:  Antonio Zapata    :  1975  MRN: [id number]      Medical/Treatment Diagnosis Information:  Diagnosis: M75.00 (ICD-10-CM) - Adhesive capsulitis  Treatment Diagnosis: increased R shoulder pain, decreased RUE AROM, difficulty reaching OH    Insurance/Certification information:  PT Insurance Information: BCBS    Physician Information:  Referring Provider (secondary): Lisa Connelly DO      Plan of care sent to provider:      [x]Faxed   []Co-signature    (attempts: 1[x] 23 2 []3[])     Plan of care signed :  []  Yes  [x] No      Date of Patient follow up with Physician: May (1 month)    Is this a Progress Report:     []  Yes  [x]  No      If Yes:  Date Range for reporting period:  Beginning 2023  Ending    Progress report will be due (10 Rx or 30 days whichever is less):     01  Recertification will be due (POC Duration  / 90 days whichever is less): 23      Visit # Insurance Allowable Auth Required     50 PT/OT/ST []  Yes [x]  No        Functional Scale:  SPADI    2023: 81/130 = 62%    Latex Allergy:  [x]NO      []YES  Preferred Language for Healthcare:   [x]English       []other:    RESTRICTIONS/PRECAUTIONS:  HTN, asthma, OA    SUBJECTIVE:  Exercises going well at home, feels good stretch into OH. Still has pain with reaching [place]. Pain level:  0/10      Plan Moving Forward/ For next visit:    AROM   OH strengthening      OBJECTIVE: See eval    Exercises/Interventions: R shoulder    Exercises in bold performed in department today. Items not bolded are carried forward from prior visits for continuity of the record.   Exercise/Equipment Resistance/  level  Sets/sec Reps  HEP Notes     THERAPEUTIC EXERCISE        UBE -fwd/bwd  Pulleys- flex, abd  2'/2'  []    BUE ER    []    Scap squeeze        High row  Lat pull down  Mid row  ER  IR

## 2023-05-03 ENCOUNTER — HOSPITAL ENCOUNTER (OUTPATIENT)
Dept: PHYSICAL THERAPY | Age: 48
Setting detail: THERAPIES SERIES
Discharge: HOME | End: 2023-05-03
Payer: COMMERCIAL

## 2023-05-03 PROCEDURE — 97140 MANUAL THERAPY 1/> REGIONS: CPT

## 2023-05-03 PROCEDURE — 97110 THERAPEUTIC EXERCISES: CPT

## 2023-05-03 NOTE — FLOWSHEET NOTE
Physical Therapy Daily Treatment Note    [x]Daily Tx Note    []Progress Note    [] Discharge Summary         Date:  5/3/2023    Patient Name:  Rich Montenegro    :  1975  MRN: [id number]      Medical/Treatment Diagnosis Information:  Diagnosis: M75.00 (ICD-10-CM) - Adhesive capsulitis  Treatment Diagnosis: increased R shoulder pain, decreased RUE AROM, difficulty reaching OH    Insurance/Certification information:  PT Insurance Information: BCBS    Physician Information:  Referring Provider (secondary): Dayna Trevino DO      Plan of care sent to provider:      [x]Faxed   []Co-signature    (attempts: 1[x] 23 2 []3[])     Plan of care signed :  []  Yes  [x] No      Date of Patient follow up with Physician: May (1 month)    Is this a Progress Report:     []  Yes  [x]  No      If Yes:  Date Range for reporting period:  Beginning 2023  Ending    Progress report will be due (10 Rx or 30 days whichever is less):       Recertification will be due (POC Duration  / 90 days whichever is less): 23      Visit # Insurance Allowable Auth Required   3/8  50 PT/OT/ST []  Yes [x]  No        Functional Scale:  SPADI    2023: 81/130 = 62%    Latex Allergy:  [x]NO      []YES  Preferred Language for Healthcare:   [x]English       []other:    RESTRICTIONS/PRECAUTIONS:  HTN, asthma, OA    SUBJECTIVE:  Having minimal pain from sleeping wrong on shoulder last night. Felt some fatigue in shoulder after last visit. Pain level:  0/10      Plan Moving Forward/ For next visit:    AROM   OH strengthening      OBJECTIVE: See eval    Exercises/Interventions: R shoulder    Exercises in bold performed in department today. Items not bolded are carried forward from prior visits for continuity of the record.   Exercise/Equipment Resistance/  level  Sets/sec Reps  HEP Notes     THERAPEUTIC EXERCISE        UBE -fwd/bwd  Pulleys- flex, abd  2'/2'  []    BUE ER    []    Scap squeeze        High row  Lat pull

## 2023-05-08 ENCOUNTER — HOSPITAL ENCOUNTER (OUTPATIENT)
Dept: PHYSICAL THERAPY | Age: 48
Setting detail: THERAPIES SERIES
Discharge: HOME | End: 2023-05-08
Payer: COMMERCIAL

## 2023-05-08 PROCEDURE — 97110 THERAPEUTIC EXERCISES: CPT

## 2023-05-08 PROCEDURE — 97140 MANUAL THERAPY 1/> REGIONS: CPT

## 2023-05-08 PROCEDURE — 97112 NEUROMUSCULAR REEDUCATION: CPT

## 2023-05-08 NOTE — FLOWSHEET NOTE
Physical Therapy Daily Treatment Note    [x]Daily Tx Note    []Progress Note    [] Discharge Summary         Date:  2023    Patient Name:  Celester Brunner    :  1975  MRN: [id number]      Medical/Treatment Diagnosis Information:  Diagnosis: M75.00 (ICD-10-CM) - Adhesive capsulitis  Treatment Diagnosis: increased R shoulder pain, decreased RUE AROM, difficulty reaching OH    Insurance/Certification information:  PT Insurance Information: BCBS    Physician Information:  Referring Provider (secondary): Rashida Lawrence DO      Plan of care sent to provider:      [x]Faxed   []Co-signature    (attempts: 1[x] 23 2 [x] [])     Plan of care signed :  []  Yes  [x] No      Date of Patient follow up with Physician: May (1 month)    Is this a Progress Report:     []  Yes  [x]  No      If Yes:  Date Range for reporting period:  Beginning 2023  Ending    Progress report will be due (10 Rx or 30 days whichever is less):       Recertification will be due (POC Duration  / 90 days whichever is less): 23      Visit # Insurance Allowable Auth Required     50 PT/OT/ST []  Yes [x]  No        Functional Scale:  SPADI    2023: 81/130 = 62%    Latex Allergy:  [x]NO      []YES  Preferred Language for Healthcare:   [x]English       []other:    RESTRICTIONS/PRECAUTIONS:  HTN, asthma, OA    SUBJECTIVE:  Having minimal pain from sleeping wrong on shoulder last night. Felt some fatigue in shoulder after last visit. Pain level:  0/10      Plan Moving Forward/ For next visit:    AROM   OH strengthening      OBJECTIVE: See eval    Exercises/Interventions: R shoulder    Exercises in bold performed in department today. Items not bolded are carried forward from prior visits for continuity of the record.   Exercise/Equipment Resistance/  level  Sets/sec Reps  HEP Notes     THERAPEUTIC EXERCISE        UBE -fwd/bwd  Pulleys- flex, abd  2'/2'  []    BUE ER    []    Scap squeeze        High

## 2023-05-10 ENCOUNTER — HOSPITAL ENCOUNTER (OUTPATIENT)
Dept: PHYSICAL THERAPY | Age: 48
Setting detail: THERAPIES SERIES
Discharge: HOME | End: 2023-05-10
Payer: COMMERCIAL

## 2023-05-10 NOTE — PROGRESS NOTES
PATIENT CALLED AND STATED HE NEEDED TO CANCEL TODAY'S APPOINTMENT 5/10/23 DUE TO WORK CONFLICT, CONFIRMED NEXT APPOINTMENT AT [place].

## 2023-05-10 NOTE — PROGRESS NOTES
Physical Therapy  Cancellation/No-show Note    Patient Name:  Ilene Redmond  :  1975   Date:  5/10/2023  Cancels to Date: 2  No-shows to Date: 0    For today's appointment patient:  [x]  Cancelled - 23, 5/10/23  []  Rescheduled appointment  []  No-show     Reason given by patient:  []  Patient ill  []  Conflicting appointment  []  No transportation    [x]  Conflict with work  []  No reason given  []  Other:     Comments:      Electronically signed by:  Bree Black PT, DPT

## 2023-05-15 ENCOUNTER — APPOINTMENT (OUTPATIENT)
Dept: PHYSICAL THERAPY | Age: 48
End: 2023-05-15
Payer: COMMERCIAL

## 2023-05-17 ENCOUNTER — APPOINTMENT (OUTPATIENT)
Dept: PHYSICAL THERAPY | Age: 48
End: 2023-05-17
Payer: COMMERCIAL

## 2023-05-22 ENCOUNTER — HOSPITAL ENCOUNTER (OUTPATIENT)
Dept: PHYSICAL THERAPY | Age: 48
Setting detail: THERAPIES SERIES
Discharge: HOME | End: 2023-05-22
Payer: COMMERCIAL

## 2023-05-22 PROCEDURE — 97112 NEUROMUSCULAR REEDUCATION: CPT

## 2023-05-22 PROCEDURE — 97110 THERAPEUTIC EXERCISES: CPT

## 2023-05-22 NOTE — FLOWSHEET NOTE
Physical Therapy Daily Treatment Note    [x]Daily Tx Note    []Progress Note    [] Discharge Summary         Date:  2023    Patient Name:  Anibal Mcdowell    :  1975  MRN: [id number]      Medical/Treatment Diagnosis Information:  Diagnosis: M75.00 (ICD-10-CM) - Adhesive capsulitis  Treatment Diagnosis: increased R shoulder pain, decreased RUE AROM, difficulty reaching OH    Insurance/Certification information:  PT Insurance Information: BCBS    Physician Information:  Referring Provider (secondary): Nafisa Schuler DO    Plan of care sent to provider:      [x]Faxed   []Co-signature    (attempts: 1[x] 23 2 [x] [])     Plan of care signed :  []  Yes  [x] No    Is this a Progress Report:     []  Yes  [x]  No      If Yes:  Date Range for reporting period:  Beginning 2023  Ending    Progress report will be due (10 Rx or 30 days whichever is less):       Recertification will be due (POC Duration  / 90 days whichever is less): 23      Visit # Insurance Allowable Auth Required     50 PT/OT/ST []  Yes [x]  No        Functional Scale:  SPADI    2023: 81/130 = 62%    Latex Allergy:  [x]NO      []YES  Preferred Language for Healthcare:   [x]English       []other:    RESTRICTIONS/PRECAUTIONS:  HTN, asthma, OA    SUBJECTIVE:  Pt states that his shoulder is getting better. Less pain and able to reach [place], with minimal tightness. Pain level:  0/10      Plan Moving Forward/ For next visit:    AROM   OH strengthening      OBJECTIVE: See eval    Exercises/Interventions: R shoulder    Exercises in bold performed in department today. Items not bolded are carried forward from prior visits for continuity of the record.   Exercise/Equipment Resistance/  level  Sets/sec Reps  HEP Notes     THERAPEUTIC EXERCISE        UBE -fwd/bwd  Pulleys- flex, abd    2'/2'  []    BUE ER    []    Scap squeeze        High row  Lat pull down  Mid row  ER  IR Bladimir Price

## 2023-05-24 ENCOUNTER — HOSPITAL ENCOUNTER (OUTPATIENT)
Dept: PHYSICAL THERAPY | Age: 48
Setting detail: THERAPIES SERIES
Discharge: HOME | End: 2023-05-24
Payer: COMMERCIAL

## 2023-05-24 NOTE — PROGRESS NOTES
Physical Therapy  Cancellation/No-show Note    Patient Name:  Caden Chambers  :  1975   Date:  2023  Cancels to Date: 2  No-shows to Date: 1    For today's appointment patient:  []  Cancelled - 23, 5/10/23  []  Rescheduled appointment  [x]  No-show - 23     Reason given by patient:  []  Patient ill  []  Conflicting appointment  []  No transportation    []  Conflict with work  [x]  No reason given  []  Other:     Comments:      Electronically signed by:  Maryana Hernandez, PT, DPT

## 2023-06-01 ENCOUNTER — APPOINTMENT (OUTPATIENT)
Dept: PHYSICAL THERAPY | Age: 48
End: 2023-06-01
Payer: COMMERCIAL

## 2023-07-10 ENCOUNTER — ANESTHESIA EVENT (OUTPATIENT)
Dept: ENDOSCOPY | Age: 48
End: 2023-07-10
Payer: COMMERCIAL

## 2023-07-11 ENCOUNTER — ANESTHESIA (OUTPATIENT)
Dept: ENDOSCOPY | Age: 48
End: 2023-07-11
Payer: COMMERCIAL

## 2023-07-11 ENCOUNTER — HOSPITAL ENCOUNTER (OUTPATIENT)
Age: 48
Setting detail: OUTPATIENT SURGERY
Discharge: HOME | End: 2023-07-11
Attending: INTERNAL MEDICINE | Admitting: INTERNAL MEDICINE
Payer: COMMERCIAL

## 2023-07-11 VITALS
RESPIRATION RATE: 16 BRPM | HEIGHT: 70 IN | TEMPERATURE: 96.8 F | OXYGEN SATURATION: 99 % | BODY MASS INDEX: 45.1 KG/M2 | WEIGHT: 315 LBS | DIASTOLIC BLOOD PRESSURE: 74 MMHG | SYSTOLIC BLOOD PRESSURE: 103 MMHG | HEART RATE: 64 BPM

## 2023-07-11 DIAGNOSIS — Z83.71 FAMILY HISTORY OF POLYPS IN THE COLON: ICD-10-CM

## 2023-07-11 DIAGNOSIS — Z12.11 ENCOUNTER FOR SCREENING COLONOSCOPY: ICD-10-CM

## 2023-07-11 PROCEDURE — 3700000000 HC ANESTHESIA ATTENDED CARE: Performed by: INTERNAL MEDICINE

## 2023-07-11 PROCEDURE — 2500000003 HC RX 250 WO HCPCS: Performed by: NURSE ANESTHETIST, CERTIFIED REGISTERED

## 2023-07-11 PROCEDURE — 3700000001 HC ADD 15 MINUTES (ANESTHESIA): Performed by: INTERNAL MEDICINE

## 2023-07-11 PROCEDURE — 88305 TISSUE EXAM BY PATHOLOGIST: CPT

## 2023-07-11 PROCEDURE — 7100000010 HC PHASE II RECOVERY - FIRST 15 MIN: Performed by: INTERNAL MEDICINE

## 2023-07-11 PROCEDURE — 7100000011 HC PHASE II RECOVERY - ADDTL 15 MIN: Performed by: INTERNAL MEDICINE

## 2023-07-11 PROCEDURE — 2580000003 HC RX 258: Performed by: ANESTHESIOLOGY

## 2023-07-11 PROCEDURE — 2709999900 HC NON-CHARGEABLE SUPPLY: Performed by: INTERNAL MEDICINE

## 2023-07-11 PROCEDURE — 6360000002 HC RX W HCPCS: Performed by: NURSE ANESTHETIST, CERTIFIED REGISTERED

## 2023-07-11 PROCEDURE — 3609010600 HC COLONOSCOPY POLYPECTOMY SNARE/COLD BIOPSY: Performed by: INTERNAL MEDICINE

## 2023-07-11 RX ORDER — SODIUM CHLORIDE 0.9 % (FLUSH) 0.9 %
5-40 SYRINGE (ML) INJECTION PRN
Status: DISCONTINUED | OUTPATIENT
Start: 2023-07-11 | End: 2023-07-11 | Stop reason: HOSPADM

## 2023-07-11 RX ORDER — SODIUM CHLORIDE 9 MG/ML
INJECTION, SOLUTION INTRAVENOUS PRN
Status: CANCELLED | OUTPATIENT
Start: 2023-07-11

## 2023-07-11 RX ORDER — PROPOFOL 10 MG/ML
INJECTION, EMULSION INTRAVENOUS PRN
Status: DISCONTINUED | OUTPATIENT
Start: 2023-07-11 | End: 2023-07-11 | Stop reason: SDUPTHER

## 2023-07-11 RX ORDER — ONDANSETRON 2 MG/ML
4 INJECTION INTRAMUSCULAR; INTRAVENOUS EVERY 30 MIN PRN
Status: CANCELLED | OUTPATIENT
Start: 2023-07-11

## 2023-07-11 RX ORDER — OXYCODONE HYDROCHLORIDE 5 MG/1
5 TABLET ORAL PRN
Status: CANCELLED | OUTPATIENT
Start: 2023-07-11 | End: 2023-07-11

## 2023-07-11 RX ORDER — SODIUM CHLORIDE 0.9 % (FLUSH) 0.9 %
5-40 SYRINGE (ML) INJECTION EVERY 12 HOURS SCHEDULED
Status: CANCELLED | OUTPATIENT
Start: 2023-07-11

## 2023-07-11 RX ORDER — SODIUM CHLORIDE 9 MG/ML
INJECTION, SOLUTION INTRAVENOUS PRN
Status: DISCONTINUED | OUTPATIENT
Start: 2023-07-11 | End: 2023-07-11 | Stop reason: HOSPADM

## 2023-07-11 RX ORDER — SODIUM CHLORIDE, SODIUM LACTATE, POTASSIUM CHLORIDE, CALCIUM CHLORIDE 600; 310; 30; 20 MG/100ML; MG/100ML; MG/100ML; MG/100ML
INJECTION, SOLUTION INTRAVENOUS CONTINUOUS
Status: DISCONTINUED | OUTPATIENT
Start: 2023-07-11 | End: 2023-07-11 | Stop reason: HOSPADM

## 2023-07-11 RX ORDER — SODIUM CHLORIDE 0.9 % (FLUSH) 0.9 %
5-40 SYRINGE (ML) INJECTION EVERY 12 HOURS SCHEDULED
Status: DISCONTINUED | OUTPATIENT
Start: 2023-07-11 | End: 2023-07-11 | Stop reason: HOSPADM

## 2023-07-11 RX ORDER — LIDOCAINE HYDROCHLORIDE 10 MG/ML
1 INJECTION, SOLUTION EPIDURAL; INFILTRATION; INTRACAUDAL; PERINEURAL
Status: DISCONTINUED | OUTPATIENT
Start: 2023-07-11 | End: 2023-07-11 | Stop reason: HOSPADM

## 2023-07-11 RX ORDER — OXYCODONE HYDROCHLORIDE 5 MG/1
10 TABLET ORAL PRN
Status: CANCELLED | OUTPATIENT
Start: 2023-07-11 | End: 2023-07-11

## 2023-07-11 RX ORDER — LIDOCAINE HYDROCHLORIDE 20 MG/ML
INJECTION, SOLUTION EPIDURAL; INFILTRATION; INTRACAUDAL; PERINEURAL PRN
Status: DISCONTINUED | OUTPATIENT
Start: 2023-07-11 | End: 2023-07-11 | Stop reason: SDUPTHER

## 2023-07-11 RX ORDER — SODIUM CHLORIDE 0.9 % (FLUSH) 0.9 %
5-40 SYRINGE (ML) INJECTION PRN
Status: CANCELLED | OUTPATIENT
Start: 2023-07-11

## 2023-07-11 RX ADMIN — PROPOFOL 30 MG: 10 INJECTION, EMULSION INTRAVENOUS at 12:55

## 2023-07-11 RX ADMIN — PROPOFOL 20 MG: 10 INJECTION, EMULSION INTRAVENOUS at 12:56

## 2023-07-11 RX ADMIN — LIDOCAINE HYDROCHLORIDE 30 MG: 20 INJECTION, SOLUTION EPIDURAL; INFILTRATION; INTRACAUDAL; PERINEURAL at 12:50

## 2023-07-11 RX ADMIN — SODIUM CHLORIDE, POTASSIUM CHLORIDE, SODIUM LACTATE AND CALCIUM CHLORIDE: 600; 310; 30; 20 INJECTION, SOLUTION INTRAVENOUS at 12:14

## 2023-07-11 RX ADMIN — PROPOFOL 50 MG: 10 INJECTION, EMULSION INTRAVENOUS at 12:53

## 2023-07-11 RX ADMIN — PROPOFOL 10 MG: 10 INJECTION, EMULSION INTRAVENOUS at 13:03

## 2023-07-11 RX ADMIN — PROPOFOL 10 MG: 10 INJECTION, EMULSION INTRAVENOUS at 13:01

## 2023-07-11 RX ADMIN — PROPOFOL 50 MG: 10 INJECTION, EMULSION INTRAVENOUS at 12:58

## 2023-07-11 ASSESSMENT — PAIN - FUNCTIONAL ASSESSMENT
PAIN_FUNCTIONAL_ASSESSMENT: 0-10
PAIN_FUNCTIONAL_ASSESSMENT: 0-10

## 2023-07-11 ASSESSMENT — PAIN SCALES - GENERAL
PAINLEVEL_OUTOF10: 0
PAINLEVEL_OUTOF10: 0

## 2023-07-11 NOTE — DISCHARGE INSTRUCTIONS
ENDOSCOPY:  Inspection of any cavity of the body by means of an endoscopy. An endoscope is a flexible tube that allows direct visualization of the cavity. You have had a Colonoscopy    Discharge Instructions    1)  You may experience some lightheadedness for the next several hours. We suggest you plan on quiet relaxation for the rest of the day. 2)  Because of the sedative drugs in your blood steam, be advised that you should not drive, operate any machinery, or sign contracts for the remainder of the day. 3)  You should not take any alcoholic beverages tonight, and only take sleeping medication that has been specifically prescribed for you by your physician. 4)  Unless instructed differently, resume your regular diet. Eat smaller portions for your first meal and progress to normal amounts over the rest of the day. 5)  If you have any fever, chills, excessive bleeding, uncontrolled pain, increased abdominal bloating, or any other problems, notify your doctor or return to the hospital.    6)  Do not expect a normal bowel movement for one to three days due to the cleansing of the large intestine prior to the colonoscopy. 7) If you have a polyp removed, do not do any strenuous activity and avoid the use of Aspirin or related compounds for 2 week.     8) Call for biopsy results in one week:  [phone]    9)  Follow high fiber diet instruction paper

## 2023-07-11 NOTE — H&P
PROT, BILIDIR, BILITOT, ALKPHOS in the last 72 hours. Invalid input(s): AMYLASE,  ALB  PT/INR: No results for input(s): INR in the last 72 hours. Invalid input(s): PT    Pre-Procedure Assessment / Plan:  ASA: Class 3 - A patient with severe systemic disease that limits activity but is not incapacitating  Airway: Mallampati: II (soft palate, uvula, fauces visible)  Level of Sedation Plan:Deep sedation  Post Procedure plan: Return to same level of care      Plan:   Colonoscopy    I assessed the patient and find that the patient is in satisfactory condition to proceed with the planned procedure and sedation plan. I have explained the risk, benefits, and alternatives to the procedure; the patient understands and agrees to proceed.        Anny Billings DO  7/11/2023

## 2023-07-11 NOTE — OP NOTE
Colonoscopy Note    Patient:   Seth Sharpe    YOB: 1975    Facility:     [address] [place] [address] [address] [place] [id number]   [Inpatient]  Referring/PCP: Chris Hercules MD  Procedure:   Colonoscopy with polypectomy (cold snare)  Date:     7/11/2023  Endoscopist:  Samantha Morales DO, DO    Preoperative Diagnosis:  screening for colon cancer. Postoperative Diagnosis:  same    Anesthesia: MAC    Estimated blood loss: < 5 ml    Complications:  None    Description of Procedure:  Informed consent was obtained from the patient after explanation of the procedure including indications, description of the procedure,  benefits and possible risks and complications of the procedure, and alternatives. Questions were answered. The patient's history was reviewed and a directed physical examination was performed prior to the procedure. Patient was monitored throughout the procedure with pulse oximetry and periodic assessment of vital signs. Patient was sedated as noted above. The Nursing staff and I performed a time out. With the patient initially in the left lateral decubitus position, a digital rectal examination was performed and revealed negative without mass, lesions or tenderness. The Olympus video colonoscope was placed in the patient's rectum and advanced without difficulty  to the cecum, which was identified by the ileocecal valve and appendiceal orifice. Photographs were taken. The prep was excellent. Examination of the mucosa was performed during both introduction and withdrawal of the colonoscope. Retroflexed view of the rectum was performed. Withdrawal time was over 6 minutes.       Findings:     1. 8 mm sessile polyp sigmoid colon, removed with cold snare     Recommendations:    Await pathology results  Repeat colonoscopy in 7 years pending results    Samantha Morales DO    [address]    [address]      [address] [facility] [place], [place]

## 2024-05-06 ENCOUNTER — HOSPITAL ENCOUNTER (OUTPATIENT)
Dept: PHYSICAL THERAPY | Age: 49
Setting detail: THERAPIES SERIES
Discharge: HOME | End: 2024-05-06
Payer: COMMERCIAL

## 2024-05-06 DIAGNOSIS — M25.572 LEFT ANKLE PAIN, UNSPECIFIED CHRONICITY: ICD-10-CM

## 2024-05-06 DIAGNOSIS — M25.562 LEFT KNEE PAIN, UNSPECIFIED CHRONICITY: Primary | ICD-10-CM

## 2024-05-06 PROCEDURE — 97110 THERAPEUTIC EXERCISES: CPT

## 2024-05-06 PROCEDURE — 97161 PT EVAL LOW COMPLEX 20 MIN: CPT

## 2024-05-06 NOTE — PLAN OF CARE
[facility]- Outpatient Rehabilitation and Therapy [address] [place] [address] [place] [address] office: [phone] fax: [phone]     Physical Therapy Initial Evaluation Certification      Dear Gilles Garcia DO,    We had the pleasure of evaluating the following patient for physical therapy services at [facility].  A summary of our findings can be found in the initial assessment below.  This includes our plan of care.  If you have any questions or concerns regarding these findings, please do not hesitate to contact me at the office phone number listed above.  Thank you for the referral.     Physician Signature:_______________________________Date:__________________  By signing above (or electronic signature), therapist’s plan is approved by physician       Physical Therapy: TREATMENT/PROGRESS NOTE   Patient: Libby Griffin (49 y.o. male)   Examination Date: 2024   :  1975 MRN: [id number]   Visit #: 1   Insurance Allowable Auth Needed   50 richard yr []Yes    [x]No    Insurance: Payor: Funding Options / Plan: [employer] FEDERAL / Product Type: *No Product type* /   Insurance ID: [id number] - ([place] [employer])  Secondary Insurance (if applicable):    Treatment Diagnosis:     ICD-10-CM    1. Left knee pain, unspecified chronicity  M25.562       2. Left ankle pain, unspecified chronicity  M25.572          Medical Diagnosis:  Ankle pain, left [M25.572]   Referring Physician: Gilles Garcia DO  PCP: Jarrett Roque MD     Plan of care signed (Y/N):     Date of Patient follow up with Physician:      Progress Report/POC: EVAL today PN due 24  POC update due: (10 visits /OR AUTH LIMITS, whichever is less)  2024                                             Precautions/ Contra-indications:           Latex allergy:  NO  Pacemaker:    NO  Contraindications for Manipulation: None  Date of Surgery: Last sx on ankle   Other:    Red Flags:  None    C-SSRS Triggered by

## 2024-05-08 ENCOUNTER — HOSPITAL ENCOUNTER (OUTPATIENT)
Dept: PHYSICAL THERAPY | Age: 49
Setting detail: THERAPIES SERIES
Discharge: HOME | End: 2024-05-08
Payer: COMMERCIAL

## 2024-05-08 PROCEDURE — 97140 MANUAL THERAPY 1/> REGIONS: CPT

## 2024-05-08 PROCEDURE — 97035 APP MDLTY 1+ULTRASOUND EA 15: CPT

## 2024-05-08 NOTE — FLOWSHEET NOTE
pain  [] Progressing: [] Met: [] Not Met: [] Adjusted    Therapist goals for Patient:   Short Term Goals: To be achieved in: 2 weeks  1. Independent in HEP and progression per patient tolerance, in order to prevent re-injury.   [] Progressing: [] Met: [] Not Met: [] Adjusted  2. Patient will have a decrease in pain to <2/10 to facilitate improvement in movement, function, and ADLs as indicated by Functional Deficits.  [] Progressing: [] Met: [] Not Met: [] Adjusted    Long Term Goals: To be achieved in: 4 weeks  1. Disability index score of 20% or less for the  lower limb functional index  to assist with reaching prior level of function with activities such as walking on uneven surfaces.  [] Progressing: [] Met: [] Not Met: [] Adjusted  2. Patient will demonstrate increased AROM of Left Dorsiflexion to 10 degress without pain to allow for proper joint functioning to enable patient to walk without increased pain.   [] Progressing: [] Met: [] Not Met: [] Adjusted  3. Patient will demonstrate increased Strength of VMO to demonstrate improved muscle activation/motor control to allow for proper functional mobility to enable patient to return to normal ADL's without increased pain.   [] Progressing: [] Met: [] Not Met: [] Adjusted  4. 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   [] Progressing: [] Met: [] Not Met: [] Adjusted       Overall Progression Towards Functional goals/ Treatment Progress Update:  [] Patient is progressing as expected towards functional goals listed.    [] Progression is slowed due to complexities/Impairments listed.  [] Progression has been slowed due to co-morbidities.  [x] Plan just implemented, too soon (<30days) to assess goals progression   [] Goals require adjustment due to lack of progress  [] Patient is not progressing as expected and requires additional follow up with physician  [] Other:

## 2024-05-14 ENCOUNTER — HOSPITAL ENCOUNTER (OUTPATIENT)
Dept: PHYSICAL THERAPY | Age: 49
Setting detail: THERAPIES SERIES
Discharge: HOME | End: 2024-05-14
Payer: COMMERCIAL

## 2024-05-14 NOTE — FLOWSHEET NOTE
[facility]- Outpatient Rehabilitation and Therapy  [facility] [place] [address] office: [phone] fax: [phone]        Physical Therapy: TREATMENT/PROGRESS NOTE   Patient: Libby Griffin (49 y.o. male)   Examination Date: 2024   :  1975 MRN: [id number]   Visit #: 3   Insurance Allowable Auth Needed   50 richard yr []Yes    [x]No    Insurance: Payor: [employer] / Plan: [employer] FEDERAL / Product Type: *No Product type* /   Insurance ID: [id number] - ([employer])  Secondary Insurance (if applicable):    Treatment Diagnosis:     Left knee pain, unspecified chronicity  M25.562          2. Left ankle pain, unspecified chronicity  M25.572        Medical Diagnosis:  Ankle pain, left [M25.572]   Referring Physician: Gilles Garcia DO  PCP: Jarrett Roque MD     Plan of care signed (Y/N):     Date of Patient follow up with Physician:      Progress Report/POC: NO PN due 24  POC update due: (10 visits /OR AUTH LIMITS, whichever is less)  POC due 2024                                             Precautions/ Contra-indications:           Latex allergy:  NO  Pacemaker:    NO  Contraindications for Manipulation: None  Date of Surgery: Last sx on ankle   Other:    Red Flags:  None    C-SSRS Triggered by Intake questionnaire:   Patient answered 'NO' to both behavioral questions on intake.  No further screening warranted    Preferred Language for Healthcare:   [x] English       [] other:    SUBJECTIVE EXAMINATION     Patient stated complaint:         Test used Initial score  2024   Pain Summary      Functional questionnaire LLFI 50/100 50% disability    Other:              Pain on eval :  Pain location: lateral left ankle , patellar ligament knee does feel swelling at times.   Patient describes pain in ankle to be constant and Sharp, pain in knee intermittent dull aching pain with stepping  Pain decreases with: Ice, Heat, and Medication Tylenol PRN tries not

## 2024-05-16 ENCOUNTER — HOSPITAL ENCOUNTER (OUTPATIENT)
Dept: PHYSICAL THERAPY | Age: 49
Setting detail: THERAPIES SERIES
End: 2024-05-16
Payer: COMMERCIAL

## 2024-05-21 ENCOUNTER — APPOINTMENT (OUTPATIENT)
Dept: PHYSICAL THERAPY | Age: 49
End: 2024-05-21
Payer: COMMERCIAL

## 2024-05-23 ENCOUNTER — APPOINTMENT (OUTPATIENT)
Dept: PHYSICAL THERAPY | Age: 49
End: 2024-05-23
Payer: COMMERCIAL

## 2024-05-28 ENCOUNTER — APPOINTMENT (OUTPATIENT)
Dept: PHYSICAL THERAPY | Age: 49
End: 2024-05-28
Payer: COMMERCIAL

## 2024-05-30 ENCOUNTER — APPOINTMENT (OUTPATIENT)
Dept: PHYSICAL THERAPY | Age: 49
End: 2024-05-30
Payer: COMMERCIAL

## (undated) DEVICE — ELECTRODE,ECG,STRESS,FOAM,3PK: Brand: MEDLINE

## (undated) DEVICE — CANNULA NSL AD TBNG L7FT PVC STR NONFLARED PRNG O2 DEL W STD

## (undated) DEVICE — ENDOSCOPIC KIT 2 12 FT OP4 DE2 GWN SYR

## (undated) DEVICE — TRAP SPEC POLYPR SGL CHMBR FN MESH SCRN

## (undated) DEVICE — SNARE ENDOSCP L240CM SHTH DIA24MM LOOP W10MM POLYP RND REINF